# Patient Record
Sex: MALE | Race: WHITE | NOT HISPANIC OR LATINO | Employment: OTHER | URBAN - METROPOLITAN AREA
[De-identification: names, ages, dates, MRNs, and addresses within clinical notes are randomized per-mention and may not be internally consistent; named-entity substitution may affect disease eponyms.]

---

## 2017-09-13 ENCOUNTER — AMBULATORY - RIVER FALLS (OUTPATIENT)
Dept: FAMILY MEDICINE | Facility: CLINIC | Age: 67
End: 2017-09-13

## 2017-09-14 LAB
CHOLEST SERPL-MCNC: 148 MG/DL
CHOLEST/HDLC SERPL: 3.4 {RATIO}
CREAT SERPL-MCNC: 1.31 MG/DL (ref 0.7–1.25)
GLUCOSE BLD-MCNC: 158 MG/DL (ref 65–99)
HBA1C MFR BLD: 7.1 %
HDLC SERPL-MCNC: 44 MG/DL
LDLC SERPL CALC-MCNC: 80 MG/DL
NONHDLC SERPL-MCNC: 104 MG/DL
TRIGL SERPL-MCNC: 147 MG/DL

## 2017-09-18 ENCOUNTER — OFFICE VISIT - RIVER FALLS (OUTPATIENT)
Dept: FAMILY MEDICINE | Facility: CLINIC | Age: 67
End: 2017-09-18

## 2017-09-18 ASSESSMENT — MIFFLIN-ST. JEOR: SCORE: 1750.53

## 2018-03-19 ENCOUNTER — AMBULATORY - RIVER FALLS (OUTPATIENT)
Dept: FAMILY MEDICINE | Facility: CLINIC | Age: 68
End: 2018-03-19

## 2018-03-20 LAB
CHOLEST SERPL-MCNC: 160 MG/DL
CHOLEST/HDLC SERPL: 3.9 {RATIO}
CREAT SERPL-MCNC: 1.21 MG/DL (ref 0.7–1.25)
GLUCOSE BLD-MCNC: 190 MG/DL (ref 65–99)
HBA1C MFR BLD: 7.9 %
HDLC SERPL-MCNC: 41 MG/DL
LDLC SERPL CALC-MCNC: 83 MG/DL
NONHDLC SERPL-MCNC: 119 MG/DL
PSA SERPL-MCNC: 1.5 NG/ML
TRIGL SERPL-MCNC: 275 MG/DL

## 2018-03-21 ENCOUNTER — OFFICE VISIT - RIVER FALLS (OUTPATIENT)
Dept: FAMILY MEDICINE | Facility: CLINIC | Age: 68
End: 2018-03-21

## 2018-03-21 ASSESSMENT — MIFFLIN-ST. JEOR: SCORE: 1754.16

## 2018-06-06 ENCOUNTER — OFFICE VISIT - RIVER FALLS (OUTPATIENT)
Dept: FAMILY MEDICINE | Facility: CLINIC | Age: 68
End: 2018-06-06

## 2018-06-06 ASSESSMENT — MIFFLIN-ST. JEOR: SCORE: 1736.92

## 2018-07-02 ENCOUNTER — COMMUNICATION - RIVER FALLS (OUTPATIENT)
Dept: FAMILY MEDICINE | Facility: CLINIC | Age: 68
End: 2018-07-02

## 2018-07-02 ENCOUNTER — OFFICE VISIT - RIVER FALLS (OUTPATIENT)
Dept: FAMILY MEDICINE | Facility: CLINIC | Age: 68
End: 2018-07-02

## 2018-07-02 ASSESSMENT — MIFFLIN-ST. JEOR: SCORE: 1716.96

## 2018-07-06 ENCOUNTER — OFFICE VISIT - RIVER FALLS (OUTPATIENT)
Dept: FAMILY MEDICINE | Facility: CLINIC | Age: 68
End: 2018-07-06

## 2018-07-09 ENCOUNTER — OFFICE VISIT - RIVER FALLS (OUTPATIENT)
Dept: FAMILY MEDICINE | Facility: CLINIC | Age: 68
End: 2018-07-09

## 2018-09-19 ENCOUNTER — AMBULATORY - RIVER FALLS (OUTPATIENT)
Dept: FAMILY MEDICINE | Facility: CLINIC | Age: 68
End: 2018-09-19

## 2018-09-20 LAB
CHOLEST SERPL-MCNC: 130 MG/DL
CHOLEST/HDLC SERPL: 3.7 {RATIO}
CREAT SERPL-MCNC: 1.16 MG/DL (ref 0.7–1.18)
GLUCOSE BLD-MCNC: 146 MG/DL (ref 65–99)
HBA1C MFR BLD: 6.8 %
HDLC SERPL-MCNC: 35 MG/DL
LDLC SERPL CALC-MCNC: 68 MG/DL
NONHDLC SERPL-MCNC: 95 MG/DL
TRIGL SERPL-MCNC: 206 MG/DL

## 2018-09-26 ENCOUNTER — OFFICE VISIT - RIVER FALLS (OUTPATIENT)
Dept: FAMILY MEDICINE | Facility: CLINIC | Age: 68
End: 2018-09-26

## 2018-09-26 ASSESSMENT — MIFFLIN-ST. JEOR: SCORE: 1697

## 2018-10-03 ENCOUNTER — OFFICE VISIT - RIVER FALLS (OUTPATIENT)
Dept: FAMILY MEDICINE | Facility: CLINIC | Age: 68
End: 2018-10-03

## 2018-10-03 ASSESSMENT — MIFFLIN-ST. JEOR: SCORE: 1700.63

## 2018-10-04 ENCOUNTER — SURGERY - HEALTHEAST (OUTPATIENT)
Dept: SURGERY | Facility: CLINIC | Age: 68
End: 2018-10-04

## 2018-10-04 ENCOUNTER — ANESTHESIA - HEALTHEAST (OUTPATIENT)
Dept: SURGERY | Facility: CLINIC | Age: 68
End: 2018-10-04

## 2018-10-04 ASSESSMENT — MIFFLIN-ST. JEOR: SCORE: 1870.67

## 2018-10-16 ENCOUNTER — ANESTHESIA - HEALTHEAST (OUTPATIENT)
Dept: SURGERY | Facility: HOSPITAL | Age: 68
End: 2018-10-16

## 2018-10-16 ENCOUNTER — SURGERY - HEALTHEAST (OUTPATIENT)
Dept: SURGERY | Facility: HOSPITAL | Age: 68
End: 2018-10-16

## 2018-10-22 ENCOUNTER — OFFICE VISIT - RIVER FALLS (OUTPATIENT)
Dept: FAMILY MEDICINE | Facility: CLINIC | Age: 68
End: 2018-10-22

## 2018-11-26 ENCOUNTER — OFFICE VISIT - RIVER FALLS (OUTPATIENT)
Dept: FAMILY MEDICINE | Facility: CLINIC | Age: 68
End: 2018-11-26

## 2019-05-13 ENCOUNTER — OFFICE VISIT - RIVER FALLS (OUTPATIENT)
Dept: FAMILY MEDICINE | Facility: CLINIC | Age: 69
End: 2019-05-13

## 2019-05-28 ENCOUNTER — AMBULATORY - RIVER FALLS (OUTPATIENT)
Dept: FAMILY MEDICINE | Facility: CLINIC | Age: 69
End: 2019-05-28

## 2019-05-29 ENCOUNTER — COMMUNICATION - RIVER FALLS (OUTPATIENT)
Dept: FAMILY MEDICINE | Facility: CLINIC | Age: 69
End: 2019-05-29

## 2019-05-29 LAB
A/G RATIO - HISTORICAL: 2.1 (ref 1–2.5)
ALBUMIN SERPL-MCNC: 4.7 GM/DL (ref 3.6–5.1)
ALP SERPL-CCNC: 72 UNIT/L (ref 40–115)
ALT SERPL W P-5'-P-CCNC: 25 UNIT/L (ref 9–46)
AST SERPL W P-5'-P-CCNC: 18 UNIT/L (ref 10–35)
BILIRUB SERPL-MCNC: 0.9 MG/DL (ref 0.2–1.2)
BUN SERPL-MCNC: 16 MG/DL (ref 7–25)
BUN/CREAT RATIO - HISTORICAL: 13 (ref 6–22)
CALCIUM SERPL-MCNC: 9.6 MG/DL (ref 8.6–10.3)
CHLORIDE BLD-SCNC: 101 MMOL/L (ref 98–110)
CHOLEST SERPL-MCNC: 161 MG/DL
CHOLEST/HDLC SERPL: 3.2 {RATIO}
CO2 SERPL-SCNC: 27 MMOL/L (ref 20–32)
CREAT SERPL-MCNC: 1.19 MG/DL (ref 0.7–1.18)
EGFRCR SERPLBLD CKD-EPI 2021: 62 ML/MIN/1.73M2
ERYTHROCYTE [DISTWIDTH] IN BLOOD BY AUTOMATED COUNT: 12.6 % (ref 11–15)
GLOBULIN: 2.2 (ref 1.9–3.7)
GLUCOSE BLD-MCNC: 171 MG/DL (ref 65–99)
HBA1C MFR BLD: 7.2 %
HCT VFR BLD AUTO: 43.5 % (ref 38.5–50)
HDLC SERPL-MCNC: 51 MG/DL
HGB BLD-MCNC: 14.8 GM/DL (ref 13.2–17.1)
LDLC SERPL CALC-MCNC: 83 MG/DL
MCH RBC QN AUTO: 29.7 PG (ref 27–33)
MCHC RBC AUTO-ENTMCNC: 34 GM/DL (ref 32–36)
MCV RBC AUTO: 87.3 FL (ref 80–100)
NONHDLC SERPL-MCNC: 110 MG/DL
PLATELET # BLD AUTO: 144 10*3/UL (ref 140–400)
PMV BLD: 12.7 FL (ref 7.5–12.5)
POTASSIUM BLD-SCNC: 4.1 MMOL/L (ref 3.5–5.3)
PROT SERPL-MCNC: 6.9 GM/DL (ref 6.1–8.1)
PSA SERPL-MCNC: 2.2 NG/ML
RBC # BLD AUTO: 4.98 10*6/UL (ref 4.2–5.8)
SODIUM SERPL-SCNC: 138 MMOL/L (ref 135–146)
TRIGL SERPL-MCNC: 173 MG/DL
WBC # BLD AUTO: 5.5 10*3/UL (ref 3.8–10.8)

## 2019-06-03 ENCOUNTER — OFFICE VISIT - RIVER FALLS (OUTPATIENT)
Dept: FAMILY MEDICINE | Facility: CLINIC | Age: 69
End: 2019-06-03

## 2019-06-03 ASSESSMENT — MIFFLIN-ST. JEOR: SCORE: 1737.83

## 2019-06-30 ENCOUNTER — OFFICE VISIT - RIVER FALLS (OUTPATIENT)
Dept: FAMILY MEDICINE | Facility: CLINIC | Age: 69
End: 2019-06-30

## 2019-06-30 LAB — DEPRECATED S PYO AG THROAT QL EIA: NOT DETECTED

## 2019-07-03 LAB — BACTERIA SPEC CULT: NORMAL

## 2019-12-10 ENCOUNTER — AMBULATORY - RIVER FALLS (OUTPATIENT)
Dept: FAMILY MEDICINE | Facility: CLINIC | Age: 69
End: 2019-12-10

## 2019-12-11 ENCOUNTER — COMMUNICATION - RIVER FALLS (OUTPATIENT)
Dept: FAMILY MEDICINE | Facility: CLINIC | Age: 69
End: 2019-12-11

## 2019-12-11 LAB
A/G RATIO - HISTORICAL: 2 (ref 1–2.5)
ALBUMIN SERPL-MCNC: 4.7 GM/DL (ref 3.6–5.1)
ALP SERPL-CCNC: 74 UNIT/L (ref 40–115)
ALT SERPL W P-5'-P-CCNC: 29 UNIT/L (ref 9–46)
AST SERPL W P-5'-P-CCNC: 21 UNIT/L (ref 10–35)
BILIRUB SERPL-MCNC: 1 MG/DL (ref 0.2–1.2)
BUN SERPL-MCNC: 16 MG/DL (ref 7–25)
BUN/CREAT RATIO - HISTORICAL: 13 (ref 6–22)
CALCIUM SERPL-MCNC: 9.6 MG/DL (ref 8.6–10.3)
CHLORIDE BLD-SCNC: 101 MMOL/L (ref 98–110)
CHOLEST SERPL-MCNC: 151 MG/DL
CHOLEST/HDLC SERPL: 3.4 {RATIO}
CO2 SERPL-SCNC: 29 MMOL/L (ref 20–32)
CREAT SERPL-MCNC: 1.21 MG/DL (ref 0.7–1.18)
CREAT UR-MCNC: 70 MG/DL (ref 20–320)
EGFRCR SERPLBLD CKD-EPI 2021: 60 ML/MIN/1.73M2
ERYTHROCYTE [DISTWIDTH] IN BLOOD BY AUTOMATED COUNT: 12.5 % (ref 11–15)
GLOBULIN: 2.3 (ref 1.9–3.7)
GLUCOSE BLD-MCNC: 162 MG/DL (ref 65–99)
HBA1C MFR BLD: 7 %
HCT VFR BLD AUTO: 44.1 % (ref 38.5–50)
HDLC SERPL-MCNC: 44 MG/DL
HGB BLD-MCNC: 15.2 GM/DL (ref 13.2–17.1)
LDLC SERPL CALC-MCNC: 81 MG/DL
MCH RBC QN AUTO: 29.8 PG (ref 27–33)
MCHC RBC AUTO-ENTMCNC: 34.5 GM/DL (ref 32–36)
MCV RBC AUTO: 86.5 FL (ref 80–100)
MICROALBUMIN UR-MCNC: 0.5 MG/DL
MICROALBUMIN/CREAT UR: 7 MG/G{CREAT}
NONHDLC SERPL-MCNC: 107 MG/DL
PLATELET # BLD AUTO: 151 10*3/UL (ref 140–400)
PMV BLD: 13 FL (ref 7.5–12.5)
POTASSIUM BLD-SCNC: 4.5 MMOL/L (ref 3.5–5.3)
PROT SERPL-MCNC: 7 GM/DL (ref 6.1–8.1)
RBC # BLD AUTO: 5.1 10*6/UL (ref 4.2–5.8)
SODIUM SERPL-SCNC: 139 MMOL/L (ref 135–146)
TRIGL SERPL-MCNC: 155 MG/DL
WBC # BLD AUTO: 6.4 10*3/UL (ref 3.8–10.8)

## 2019-12-16 ENCOUNTER — OFFICE VISIT - RIVER FALLS (OUTPATIENT)
Dept: FAMILY MEDICINE | Facility: CLINIC | Age: 69
End: 2019-12-16

## 2019-12-16 ASSESSMENT — MIFFLIN-ST. JEOR: SCORE: 1719.68

## 2020-05-08 ENCOUNTER — OFFICE VISIT - RIVER FALLS (OUTPATIENT)
Dept: FAMILY MEDICINE | Facility: CLINIC | Age: 70
End: 2020-05-08

## 2020-06-10 ENCOUNTER — AMBULATORY - RIVER FALLS (OUTPATIENT)
Dept: FAMILY MEDICINE | Facility: CLINIC | Age: 70
End: 2020-06-10

## 2020-06-10 ASSESSMENT — MIFFLIN-ST. JEOR: SCORE: 1708.8

## 2020-06-11 LAB
A/G RATIO - HISTORICAL: 2.2 (ref 1–2.5)
ALBUMIN SERPL-MCNC: 4.7 GM/DL (ref 3.6–5.1)
ALP SERPL-CCNC: 85 UNIT/L (ref 35–144)
ALT SERPL W P-5'-P-CCNC: 21 UNIT/L (ref 9–46)
AST SERPL W P-5'-P-CCNC: 17 UNIT/L (ref 10–35)
BILIRUB SERPL-MCNC: 1.1 MG/DL (ref 0.2–1.2)
BUN SERPL-MCNC: 18 MG/DL (ref 7–25)
BUN/CREAT RATIO - HISTORICAL: 14 (ref 6–22)
CALCIUM SERPL-MCNC: 10 MG/DL (ref 8.6–10.3)
CHLORIDE BLD-SCNC: 103 MMOL/L (ref 98–110)
CHOLEST SERPL-MCNC: 140 MG/DL
CHOLEST/HDLC SERPL: 3.5 {RATIO}
CO2 SERPL-SCNC: 29 MMOL/L (ref 20–32)
CREAT SERPL-MCNC: 1.3 MG/DL (ref 0.7–1.18)
CREAT UR-MCNC: 83 MG/DL (ref 20–320)
EGFRCR SERPLBLD CKD-EPI 2021: 55 ML/MIN/1.73M2
ERYTHROCYTE [DISTWIDTH] IN BLOOD BY AUTOMATED COUNT: 12.6 % (ref 11–15)
GLOBULIN: 2.1 (ref 1.9–3.7)
GLUCOSE BLD-MCNC: 129 MG/DL (ref 65–99)
HBA1C MFR BLD: 7 %
HCT VFR BLD AUTO: 43.7 % (ref 38.5–50)
HDLC SERPL-MCNC: 40 MG/DL
HGB BLD-MCNC: 15.1 GM/DL (ref 13.2–17.1)
LDLC SERPL CALC-MCNC: 72 MG/DL
MCH RBC QN AUTO: 30.2 PG (ref 27–33)
MCHC RBC AUTO-ENTMCNC: 34.6 GM/DL (ref 32–36)
MCV RBC AUTO: 87.4 FL (ref 80–100)
MICROALBUMIN UR-MCNC: 0.3 MG/DL
MICROALBUMIN/CREAT UR: 4 MG/G{CREAT}
NONHDLC SERPL-MCNC: 100 MG/DL
PLATELET # BLD AUTO: 130 10*3/UL (ref 140–400)
PMV BLD: 12.5 FL (ref 7.5–12.5)
POTASSIUM BLD-SCNC: 4.5 MMOL/L (ref 3.5–5.3)
PROT SERPL-MCNC: 6.8 GM/DL (ref 6.1–8.1)
PSA SERPL-MCNC: 2.3 NG/ML
RBC # BLD AUTO: 5 10*6/UL (ref 4.2–5.8)
SODIUM SERPL-SCNC: 140 MMOL/L (ref 135–146)
TRIGL SERPL-MCNC: 179 MG/DL
WBC # BLD AUTO: 5.3 10*3/UL (ref 3.8–10.8)

## 2020-06-15 ENCOUNTER — COMMUNICATION - RIVER FALLS (OUTPATIENT)
Dept: FAMILY MEDICINE | Facility: CLINIC | Age: 70
End: 2020-06-15

## 2020-06-16 ENCOUNTER — COMMUNICATION - RIVER FALLS (OUTPATIENT)
Dept: FAMILY MEDICINE | Facility: CLINIC | Age: 70
End: 2020-06-16

## 2020-06-16 LAB
ANAPLASMA PHAGOCYTOPHILUM - HISTORICAL: NORMAL
ANAPLASMA PHAGOCYTOPHILUM - HISTORICAL: NORMAL
ANAPLASMA PHAGOCYTOPHILUM AB, IGG, S: NORMAL
B BURGDOR IGG+IGM SER QL: <0.9
BABESIA MICROTI - HISTORICAL: NORMAL
BABESIA MICROTI - HISTORICAL: NORMAL TITER
BABESIA MICROTI IGG: NORMAL TITER
EHRLICHIA CHAFFEENSIS - HISTORICAL: NORMAL
EHRLICHIA CHAFFEENSIS AB,IGG - HISTORICAL: NORMAL
EHRLICHIA CHAFFEENSIS: NORMAL

## 2020-09-08 ENCOUNTER — COMMUNICATION - RIVER FALLS (OUTPATIENT)
Dept: FAMILY MEDICINE | Facility: CLINIC | Age: 70
End: 2020-09-08

## 2020-09-30 ENCOUNTER — AMBULATORY - RIVER FALLS (OUTPATIENT)
Dept: FAMILY MEDICINE | Facility: CLINIC | Age: 70
End: 2020-09-30

## 2020-10-01 LAB
A/G RATIO - HISTORICAL: 2.3 (ref 1–2.5)
ALBUMIN SERPL-MCNC: 4.5 GM/DL (ref 3.6–5.1)
ALP SERPL-CCNC: 79 UNIT/L (ref 35–144)
ALT SERPL W P-5'-P-CCNC: 22 UNIT/L (ref 9–46)
AST SERPL W P-5'-P-CCNC: 18 UNIT/L (ref 10–35)
BILIRUB SERPL-MCNC: 0.8 MG/DL (ref 0.2–1.2)
BUN SERPL-MCNC: 18 MG/DL (ref 7–25)
BUN/CREAT RATIO - HISTORICAL: 15 (ref 6–22)
CALCIUM SERPL-MCNC: 9.6 MG/DL (ref 8.6–10.3)
CHLORIDE BLD-SCNC: 104 MMOL/L (ref 98–110)
CHOLEST SERPL-MCNC: 125 MG/DL
CHOLEST/HDLC SERPL: 3 {RATIO}
CO2 SERPL-SCNC: 29 MMOL/L (ref 20–32)
CREAT SERPL-MCNC: 1.21 MG/DL (ref 0.7–1.18)
EGFRCR SERPLBLD CKD-EPI 2021: 59 ML/MIN/1.73M2
ERYTHROCYTE [DISTWIDTH] IN BLOOD BY AUTOMATED COUNT: 12.4 % (ref 11–15)
GLOBULIN: 2 (ref 1.9–3.7)
GLUCOSE BLD-MCNC: 144 MG/DL (ref 65–99)
HBA1C MFR BLD: 6.9 %
HCT VFR BLD AUTO: 43.5 % (ref 38.5–50)
HDLC SERPL-MCNC: 42 MG/DL
HGB BLD-MCNC: 14.8 GM/DL (ref 13.2–17.1)
LDLC SERPL CALC-MCNC: 58 MG/DL
MCH RBC QN AUTO: 29.9 PG (ref 27–33)
MCHC RBC AUTO-ENTMCNC: 34 GM/DL (ref 32–36)
MCV RBC AUTO: 87.9 FL (ref 80–100)
NONHDLC SERPL-MCNC: 83 MG/DL
PLATELET # BLD AUTO: 158 10*3/UL (ref 140–400)
PMV BLD: 12.5 FL (ref 7.5–12.5)
POTASSIUM BLD-SCNC: 4.8 MMOL/L (ref 3.5–5.3)
PROT SERPL-MCNC: 6.5 GM/DL (ref 6.1–8.1)
RBC # BLD AUTO: 4.95 10*6/UL (ref 4.2–5.8)
SODIUM SERPL-SCNC: 142 MMOL/L (ref 135–146)
TRIGL SERPL-MCNC: 168 MG/DL
WBC # BLD AUTO: 6.1 10*3/UL (ref 3.8–10.8)

## 2020-10-05 ENCOUNTER — COMMUNICATION - RIVER FALLS (OUTPATIENT)
Dept: FAMILY MEDICINE | Facility: CLINIC | Age: 70
End: 2020-10-05

## 2020-10-05 ENCOUNTER — OFFICE VISIT - RIVER FALLS (OUTPATIENT)
Dept: FAMILY MEDICINE | Facility: CLINIC | Age: 70
End: 2020-10-05

## 2020-10-05 ASSESSMENT — MIFFLIN-ST. JEOR: SCORE: 1706.08

## 2021-06-01 VITALS — BODY MASS INDEX: 23.16 KG/M2 | HEIGHT: 78 IN | WEIGHT: 200.19 LBS

## 2021-06-01 VITALS — BODY MASS INDEX: 20.92 KG/M2 | WEIGHT: 205 LBS

## 2021-06-20 NOTE — ANESTHESIA CARE TRANSFER NOTE
Last vitals:   Vitals:    10/04/18 1228   BP: (!) 163/95   Pulse: 69   Resp: 12   Temp: 36.7  C (98.1  F)   SpO2: 100%     Patient's level of consciousness is awake  Spontaneous respirations: yes  Maintains airway independently: yes  Dentition unchanged: yes  Oropharynx: oropharynx clear of all foreign objects    QCDR Measures:  ASA# 20 - Surgical Safety Checklist: WHO surgical safety checklist completed prior to induction  PQRS# 430 - Adult PONV Prevention: 4558F - Pt received => 2 anti-emetic agents (different classes) preop & intraop  ASA# 8 - Peds PONV Prevention: NA - Not pediatric patient, not GA or 2 or more risk factors NOT present  PQRS# 424 - Edith-op Temp Management: 4559F - At least one body temp DOCUMENTED => 35.5C or 95.9F within required timeframe  PQRS# 426 - PACU Transfer Protocol: - Transfer of care checklist used  ASA# 14 - Acute Post-op Pain: ASA14B - Patient did NOT experience pain >= 7 out of 10

## 2021-06-20 NOTE — ANESTHESIA PREPROCEDURE EVALUATION
Anesthesia Evaluation      Patient summary reviewed   No history of anesthetic complications     Airway   Mallampati: III   Pulmonary - normal exam                          Cardiovascular - normal exam  Exercise tolerance: > or = 4 METS  (+) hypertension, CAD, , hypercholesterolemia,     ECG reviewed  Rhythm: regular  Rate: normal,         Neuro/Psych      Endo/Other    (+) diabetes mellitus, obesity,      GI/Hepatic/Renal    (+)   chronic renal disease CRI,           Dental    (+) poor dentition                       Anesthesia Plan  Planned anesthetic: general endotracheal  -- RSI with Succ  -- Esmolol drawn up and ready for administration  -- PONV prophylaxis with Decadron 10 mg and Zofran 4 mg      ASA 3   Induction: intravenous   Anesthetic plan and risks discussed with: patient and spouse  Anesthesia plan special considerations: rapid sequence induction, antiemetics,   Post-op plan: routine recovery

## 2021-06-21 NOTE — ANESTHESIA POSTPROCEDURE EVALUATION
Patient: Manny Sarabia  CYSTOSCOPY, BILATERAL RETROGRADE PYELOGRAM, BILATERAL URETEROSCOPY WITH HOLMIUM LASER LITHOTRIPSY BILATERAL URETERAL STENT EXCHANGE  Anesthesia type: general    Patient location: PACU  Last vitals:   Vitals:    10/16/18 1610   BP: 140/78   Pulse: 78   Resp: 16   Temp: 36.7  C (98  F)   SpO2: 99%     Post vital signs: stable  Level of consciousness: awake and responds to simple questions  Post-anesthesia pain: pain controlled  Post-anesthesia nausea and vomiting: no  Pulmonary: unassisted, return to baseline  Cardiovascular: stable and blood pressure at baseline  Hydration: adequate  Anesthetic events: no    QCDR Measures:  ASA# 11 - Edith-op Cardiac Arrest: ASA11B - Patient did NOT experience unanticipated cardiac arrest  ASA# 12 - Edith-op Mortality Rate: ASA12B - Patient did NOT die  ASA# 13 - PACU Re-Intubation Rate: ASA13B - Patient did NOT require a new airway mgmt  ASA# 10 - Composite Anes Safety: ASA10A - No serious adverse event    Additional Notes:

## 2021-06-21 NOTE — ANESTHESIA CARE TRANSFER NOTE
Last vitals:   Vitals:    10/16/18 1439   BP: 151/82   Pulse: 81   Resp: 16   Temp: 36.6  C (97.9  F)   SpO2: 100%     Patient's level of consciousness is drowsy  Spontaneous respirations: yes  Maintains airway independently: yes  Dentition unchanged: yes  Oropharynx: oropharynx clear of all foreign objects    QCDR Measures:  ASA# 20 - Surgical Safety Checklist: WHO surgical safety checklist completed prior to induction  PQRS# 430 - Adult PONV Prevention: 4558F - Pt received => 2 anti-emetic agents (different classes) preop & intraop  ASA# 8 - Peds PONV Prevention: NA - Not pediatric patient, not GA or 2 or more risk factors NOT present  PQRS# 424 - Edith-op Temp Management: 4559F - At least one body temp DOCUMENTED => 35.5C or 95.9F within required timeframe  PQRS# 426 - PACU Transfer Protocol: - Transfer of care checklist used  ASA# 14 - Acute Post-op Pain: ASA14B - Patient did NOT experience pain >= 7 out of 10
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
## 2021-10-06 ENCOUNTER — OFFICE VISIT - RIVER FALLS (OUTPATIENT)
Dept: FAMILY MEDICINE | Facility: CLINIC | Age: 71
End: 2021-10-06

## 2021-10-06 ENCOUNTER — TRANSFERRED RECORDS (OUTPATIENT)
Dept: MULTI SPECIALTY CLINIC | Facility: CLINIC | Age: 71
End: 2021-10-06

## 2021-10-06 ASSESSMENT — MIFFLIN-ST. JEOR: SCORE: 1694.05

## 2021-10-07 LAB
BUN SERPL-MCNC: 18 MG/DL (ref 7–25)
BUN/CREAT RATIO - HISTORICAL: ABNORMAL (ref 6–22)
CALCIUM SERPL-MCNC: 9.4 MG/DL (ref 8.6–10.3)
CALCIUM SERPL-MCNC: 9.5 MG/DL (ref 8.6–10.3)
CHLORIDE BLD-SCNC: 104 MMOL/L (ref 98–110)
CHOLEST SERPL-MCNC: 118 MG/DL
CHOLEST/HDLC SERPL: 3.1 {RATIO}
CO2 SERPL-SCNC: 29 MMOL/L (ref 20–32)
CREAT SERPL-MCNC: 1.14 MG/DL (ref 0.7–1.18)
EGFRCR SERPLBLD CKD-EPI 2021: 63 ML/MIN/1.73M2
GLUCOSE BLD-MCNC: 150 MG/DL (ref 65–99)
HBA1C MFR BLD: 7.1 %
HDLC SERPL-MCNC: 38 MG/DL
LDLC SERPL CALC-MCNC: 52 MG/DL
MICROALBUMIN UR-MCNC: 0.5 MG/DL
NONHDLC SERPL-MCNC: 80 MG/DL
POTASSIUM BLD-SCNC: 4.4 MMOL/L (ref 3.5–5.3)
PSA SERPL-MCNC: 3.67 NG/ML
PTH-INTACT SERPL-MCNC: 62 PG/ML (ref 14–64)
SODIUM SERPL-SCNC: 141 MMOL/L (ref 135–146)
TRIGL SERPL-MCNC: 227 MG/DL

## 2021-10-08 ENCOUNTER — COMMUNICATION - RIVER FALLS (OUTPATIENT)
Dept: FAMILY MEDICINE | Facility: CLINIC | Age: 71
End: 2021-10-08

## 2022-02-11 VITALS
OXYGEN SATURATION: 97 % | WEIGHT: 217.6 LBS | HEIGHT: 71 IN | SYSTOLIC BLOOD PRESSURE: 134 MMHG | HEART RATE: 60 BPM | DIASTOLIC BLOOD PRESSURE: 86 MMHG | BODY MASS INDEX: 30.46 KG/M2

## 2022-02-11 VITALS
OXYGEN SATURATION: 99 % | HEART RATE: 59 BPM | SYSTOLIC BLOOD PRESSURE: 130 MMHG | HEIGHT: 71 IN | BODY MASS INDEX: 28.85 KG/M2 | DIASTOLIC BLOOD PRESSURE: 70 MMHG | WEIGHT: 206.1 LBS

## 2022-02-11 VITALS
DIASTOLIC BLOOD PRESSURE: 80 MMHG | HEART RATE: 67 BPM | OXYGEN SATURATION: 96 % | HEIGHT: 71 IN | SYSTOLIC BLOOD PRESSURE: 134 MMHG | BODY MASS INDEX: 29.4 KG/M2 | WEIGHT: 210 LBS

## 2022-02-11 VITALS
HEART RATE: 56 BPM | DIASTOLIC BLOOD PRESSURE: 80 MMHG | TEMPERATURE: 96.2 F | HEIGHT: 71 IN | WEIGHT: 207.6 LBS | SYSTOLIC BLOOD PRESSURE: 129 MMHG | BODY MASS INDEX: 29.06 KG/M2

## 2022-02-11 VITALS
SYSTOLIC BLOOD PRESSURE: 138 MMHG | WEIGHT: 214 LBS | OXYGEN SATURATION: 98 % | TEMPERATURE: 97.1 F | DIASTOLIC BLOOD PRESSURE: 82 MMHG | HEART RATE: 71 BPM | BODY MASS INDEX: 29.37 KG/M2 | HEIGHT: 71 IN | SYSTOLIC BLOOD PRESSURE: 138 MMHG | HEART RATE: 68 BPM | BODY MASS INDEX: 29.96 KG/M2 | OXYGEN SATURATION: 98 % | WEIGHT: 210.6 LBS | DIASTOLIC BLOOD PRESSURE: 68 MMHG

## 2022-02-11 VITALS
SYSTOLIC BLOOD PRESSURE: 182 MMHG | HEART RATE: 60 BPM | TEMPERATURE: 97.3 F | TEMPERATURE: 97.1 F | HEIGHT: 71 IN | BODY MASS INDEX: 29.18 KG/M2 | HEART RATE: 56 BPM | HEIGHT: 71 IN | BODY MASS INDEX: 29.93 KG/M2 | WEIGHT: 209.4 LBS | TEMPERATURE: 98.1 F | WEIGHT: 213.8 LBS | BODY MASS INDEX: 29.31 KG/M2 | DIASTOLIC BLOOD PRESSURE: 78 MMHG | HEART RATE: 56 BPM | DIASTOLIC BLOOD PRESSURE: 80 MMHG | SYSTOLIC BLOOD PRESSURE: 128 MMHG | SYSTOLIC BLOOD PRESSURE: 148 MMHG | DIASTOLIC BLOOD PRESSURE: 78 MMHG | WEIGHT: 209.2 LBS

## 2022-02-11 VITALS
BODY MASS INDEX: 28.7 KG/M2 | SYSTOLIC BLOOD PRESSURE: 122 MMHG | OXYGEN SATURATION: 98 % | WEIGHT: 205.8 LBS | WEIGHT: 205 LBS | DIASTOLIC BLOOD PRESSURE: 78 MMHG | TEMPERATURE: 97.6 F | SYSTOLIC BLOOD PRESSURE: 128 MMHG | HEART RATE: 57 BPM | DIASTOLIC BLOOD PRESSURE: 70 MMHG | HEIGHT: 71 IN | HEART RATE: 62 BPM | BODY MASS INDEX: 28.81 KG/M2 | HEART RATE: 66 BPM | HEIGHT: 71 IN | DIASTOLIC BLOOD PRESSURE: 70 MMHG | SYSTOLIC BLOOD PRESSURE: 126 MMHG

## 2022-02-11 VITALS
SYSTOLIC BLOOD PRESSURE: 126 MMHG | DIASTOLIC BLOOD PRESSURE: 62 MMHG | WEIGHT: 207 LBS | HEART RATE: 62 BPM | OXYGEN SATURATION: 98 % | HEIGHT: 71 IN | BODY MASS INDEX: 28.98 KG/M2

## 2022-02-11 VITALS
DIASTOLIC BLOOD PRESSURE: 72 MMHG | HEIGHT: 71 IN | OXYGEN SATURATION: 99 % | SYSTOLIC BLOOD PRESSURE: 132 MMHG | BODY MASS INDEX: 30.35 KG/M2 | HEART RATE: 61 BPM | WEIGHT: 216.8 LBS

## 2022-02-11 VITALS — BODY MASS INDEX: 28.7 KG/M2 | HEIGHT: 71 IN

## 2022-02-15 NOTE — NURSING NOTE
Patient had dropped off a form in clinic on 6/4/18 to be completed by KIRAN. Per KIRAN, patient will need to schedule an appt and the form can be completed at that time.   3:14pm Called and LM asking that he call back and schedule an appt at his convenience.
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
## 2022-02-15 NOTE — NURSING NOTE
Comprehensive Intake Entered On:  10/5/2020 9:32 AM CDT    Performed On:  10/5/2020 9:28 AM CDT by Elizabeth Epstein CMA               Summary   Chief Complaint :   Pt here for med ck   Weight Measured :   207 lb(Converted to: 207 lb 0 oz, 93.894 kg)    Height Measured :   71 in(Converted to: 5 ft 11 in, 180.34 cm)    Body Mass Index :   28.87 kg/m2 (HI)    Body Surface Area :   2.17 m2   Systolic Blood Pressure :   126 mmHg   Diastolic Blood Pressure :   62 mmHg   Mean Arterial Pressure :   83 mmHg   Peripheral Pulse Rate :   62 bpm   Oxygen Saturation :   98 %   Elizabeth Epstein CMA - 10/5/2020 9:28 AM CDT   Health Status   Allergies Verified? :   Yes   Medication History Verified? :   Yes   Medical History Verified? :   Yes   Pre-Visit Planning Status :   Completed   Tobacco Use? :   Never smoker   Elizabeth Epstein CMA - 10/5/2020 9:28 AM CDT   Consents   Consent for Immunization Exchange :   Consent Granted   Consent for Immunizations to Providers :   Consent Granted   Elizabeth Epstein CMA - 10/5/2020 9:28 AM CDT   Meds / Allergies   (As Of: 10/5/2020 9:32:09 AM CDT)   Allergies (Active)   No Known Medication Allergies  Estimated Onset Date:   Unspecified ; Created By:   Rubi Love CMA; Reaction Status:   Active ; Category:   Drug ; Substance:   No Known Medication Allergies ; Type:   Allergy ; Updated By:   Rubi Love CMA; Reviewed Date:   10/5/2020 9:30 AM CDT        Medication List   (As Of: 10/5/2020 9:32:09 AM CDT)   Prescription/Discharge Order    atorvastatin  :   atorvastatin ; Status:   Prescribed ; Ordered As Mnemonic:   atorvastatin 40 mg oral tablet ; Simple Display Line:   40 mg, 1 tab(s), po, daily, 90 tab(s), 2 Refill(s) ; Ordering Provider:   Timoteo Neil MD; Catalog Code:   atorvastatin ; Order Dt/Tm:   3/19/2020 10:03:04 AM CDT          lisinopril  :   lisinopril ; Status:   Prescribed ; Ordered As Mnemonic:   lisinopril 10 mg oral tablet ; Simple Display Line:   10 mg, 1 tab(s), Oral,  daily, 90 tab(s), 2 Refill(s) ; Ordering Provider:   Timoteo Neil MD; Catalog Code:   lisinopril ; Order Dt/Tm:   3/19/2020 10:03:28 AM CDT          metFORMIN  :   metFORMIN ; Status:   Prescribed ; Ordered As Mnemonic:   metFORMIN 500 mg oral tablet, extended release ; Simple Display Line:   2 tab(s), Oral, bid, 360 tab(s), 2 Refill(s) ; Ordering Provider:   Timoteo Neil MD; Catalog Code:   metFORMIN ; Order Dt/Tm:   3/19/2020 10:03:51 AM CDT            ID Risk Screen   Recent Travel History :   No recent travel   Family Member Travel History :   No recent travel   Other Exposure to Infectious Disease :   Unknown   Elizabeth Epstein CMA - 10/5/2020 9:28 AM CDT

## 2022-02-15 NOTE — NURSING NOTE
Depression Screening Entered On:  10/5/2020 10:11 AM CDT    Performed On:  10/5/2020 10:11 AM CDT by Elizabeth Epstein CMA               Depression Screening   Little Interest - Pleasure in Activities :   Not at all   Feeling Down, Depressed, Hopeless :   Not at all   Initial Depression Screen Score :   0 Score   Poor Appetite or Overeating :   Not at all   Trouble Falling or Staying Asleep :   Not at all   Feeling Tired or Little Energy :   Not at all   Feeling Bad About Yourself :   Not at all   Trouble Concentrating :   Not at all   Moving or Speaking Slowly :   Not at all   Thoughts Better Off Dead or Hurting Self :   Not at all   GRAEME Difficulty with Work, Home, Others :   Not difficult at all   Detailed Depression Screen Score :   0    Total Depression Screen Score :   0    Elizabeth Epstein CMA - 10/5/2020 10:11 AM CDT
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
## 2022-02-15 NOTE — TELEPHONE ENCOUNTER
Entered by Elizabeth Epstein CMA on December 21, 2020 9:14:35 AM CST  ---------------------  From: Elizabeth Epstein CMA   To: [facility]    Sent: 12/21/2020 9:14:35 AM CST  Subject: Medication Management     ** Submitted: **  Order:metFORMIN (MetFORMIN (Eqv-Glucophage XR) 500 mg oral tablet, extended release)  2 tab(s)  Oral  bid  Qty:  360 tab(s)        Days Supply:  90        Refills:  2          LISSA     Route To Pharmacy - [facility]    Signed by Elizabeth Epstein CMA  12/21/2020 3:14:00 PM [facility]    ** Submitted: **  Complete:metFORMIN (metFORMIN 500 mg oral tablet)   Signed by Elizabeth Epstein CMA  12/21/2020 3:14:00 PM [facility]    ** Not Approved:  **  metFORMIN (METFORMIN ER TAB 500MG GP)  TAKE 2 TABLETS TWICE A DAY  Qty:  360 tab(s)        Days Supply:  90        Refills:  2          LISSA     Route To Pharmacy - [facility]   Signed by Elizabeth Epstein CMA            ------------------------------------------  From: [facility]  To: Timoteo Neil MD  Sent: December 19, 2020 8:16:30 AM CST  Subject: Medication Management  Due: December 16, 2020 8:25:31 PM CST     ** On Hold Pending Signature **     Drug: metFORMIN (MetFORMIN (Eqv-Glucophage XR) 500 mg oral tablet, extended release), TAKE 2 TABLETS TWICE A DAY  Quantity: 360 tab(s)  Days Supply: 90  Refills: 2  Substitutions Allowed  Notes from Pharmacy:     Dispensed Drug: metFORMIN (MetFORMIN (Eqv-Glucophage XR) 500 mg oral tablet, extended release), TAKE 2 TABLETS TWICE A DAY  Quantity: 360 tab(s)  Days Supply: 90  Refills: 2  Substitutions Allowed  Notes from Pharmacy:  ------------------------------------------

## 2022-02-15 NOTE — LETTER
(Inserted Image. Unable to display)         June 16, 2020        STEPHEN JASWANT  [address] [phone]        Dear STEPHEN,    Thank you for selecting [facility] for your healthcare needs. Below you will find the results of your recent test(s) done at our clinic.      The testing was negative (normal) for tick borne diseases.  Please follow up with further concerns.  Thank you,  Malu Rodríguez MD      Result Name Current Result Reference Range   Anaplasma phagocytophilum IgG  <1:64 6/10/2020  - <1:64   Anaplasma phagocytophilum IgM  <1:20 6/10/2020  - <1:20   Anaplasma phagocytophilum Interp  See comment 6/10/2020    Ehrlichia chaffeensis IgG  <1:64 6/10/2020  - <1:64   Ehrlichia chaffeensis IgM  <1:20 6/10/2020  - <1:20   Ehrlichia chaffeensis Interp  See comment 6/10/2020    Babesia microti IgG (Titer)  <1:64 6/10/2020    Babesia microti IgM (Titer)  <1:20 6/10/2020    Babesia microti Interp  See comment 6/10/2020    Lyme Ab IgG/IgM WB  <0.90 6/10/2020        Please contact my practice at [phone] if you have any questions or concerns.     Sincerely,        Malu Rodríguez MD      What do your labs mean?  Below is a glossary of commonly ordered labs:  LDL - Bad Cholesterol  HDL- Good Cholesterol  AST/ALT- Liver Function  Cr/Creatinine - Kidney Function  Microalbumin - Kidney Function  BUN - Kidney Function  PSA - Prostate   TSH - Thyroid  HgbA1c - Diabetes Test  Hgb (Hemoglobin) - Red Blood Cells

## 2022-02-15 NOTE — LETTER
(Inserted Image. Unable to display)       March 28, 2019      STEPHENMARCIE MICHAUD  [address] [place] [id number]          Dear STEPHEN,      Thank you for selecting [facility] for your healthcare needs.     Our records indicate you are due for the following services:    Fasting Lab Tests ~ Please do not eat or drink anything 10 hours prior to your scheduled appointment time.  (Water and any medications that you may need are allowed unless directed otherwise.)    If you had your labs done at another facility or with Direct Access Lab Testing at [facility], please bring in a copy of the results to your next visit, mail a copy, or drop off a copy of your results to your Healthcare Provider.    Medication Check  Diabetic Exam ~ Please bring your glucose meter and/or your blood glucose diary to your appointment.  Hypertension Check ~ Please remember to bring any at-home blood pressure readings with you to your appointment.    You are due for lab work and an office visit; please schedule the lab appointment 1 week before the office visit.  This will assure all results are available to discuss with your Healthcare Provider during your visit.    **It is very helpful if you bring your medication bottles to your appointment.  This assures we have all of your current medications, including strength and dosing information, documented accurately in your medical record.    To schedule an appointment or if you have further questions, please contact your primary clinic:   [facility]       [phone]   [facility]       [phone]              [facility]     [phone]    Powered by [employer]    Sincerely,    Timoteo Neil MD
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
## 2022-02-15 NOTE — NURSING NOTE
Vital Signs Entered On:  10/6/2021 7:59 AM CDT    Performed On:  10/6/2021 7:59 AM CDT by Elise Mcconnell CMA               Vital Signs   Systolic Blood Pressure :   130 mmHg   Diastolic Blood Pressure :   70 mmHg   Mean Arterial Pressure :   90 mmHg   Elise Mcconnell CMA - 10/6/2021 7:59 AM CDT

## 2022-02-15 NOTE — NURSING NOTE
Comprehensive Intake Entered On:  5/8/2020 9:11 AM CDT    Performed On:  5/8/2020 9:03 AM CDT by Jeni Diaz CMA               Summary   Chief Complaint :   Verbal consent given for telephone visit. Concerns with tick bite on L flank. Removed on Monday. Possibly attached for 24-48 hrs. Site is now red, firm, itches, measuring 1in x 1.5in. Feels fine. Using peroxide daily.    Height Measured :   71 in(Converted to: 5 ft 11 in, 180.34 cm)    Jeni Diaz CMA - 5/8/2020 9:03 AM CDT   Health Status   Allergies Verified? :   Yes   Medication History Verified? :   Yes   Pre-Visit Planning Status :   Not completed   Jeni Diaz CMA - 5/8/2020 9:03 AM CDT   Meds / Allergies   (As Of: 5/8/2020 9:11:49 AM CDT)   Allergies (Active)   No Known Medication Allergies  Estimated Onset Date:   Unspecified ; Created By:   Rubi Love CMA; Reaction Status:   Active ; Category:   Drug ; Substance:   No Known Medication Allergies ; Type:   Allergy ; Updated By:   Rubi Love CMA; Reviewed Date:   12/16/2019 9:24 AM CST        Medication List   (As Of: 5/8/2020 9:11:49 AM CDT)   Prescription/Discharge Order    atorvastatin  :   atorvastatin ; Status:   Prescribed ; Ordered As Mnemonic:   atorvastatin 40 mg oral tablet ; Simple Display Line:   40 mg, 1 tab(s), po, daily, 90 tab(s), 2 Refill(s) ; Ordering Provider:   Timoteo Neil MD; Catalog Code:   atorvastatin ; Order Dt/Tm:   3/19/2020 10:03:04 AM CDT          lisinopril  :   lisinopril ; Status:   Prescribed ; Ordered As Mnemonic:   lisinopril 10 mg oral tablet ; Simple Display Line:   10 mg, 1 tab(s), Oral, daily, 90 tab(s), 2 Refill(s) ; Ordering Provider:   Timoteo Neil MD; Catalog Code:   lisinopril ; Order Dt/Tm:   3/19/2020 10:03:28 AM CDT          metFORMIN  :   metFORMIN ; Status:   Prescribed ; Ordered As Mnemonic:   metFORMIN 500 mg oral tablet, extended release ; Simple Display Line:   2 tab(s), Oral, bid, 360 tab(s), 2 Refill(s) ; Ordering Provider:    Rashaad BOYCE, Timoteo; Catalog Code:   metFORMIN ; Order Dt/Tm:   3/19/2020 10:03:51 AM CDT            ID Risk Screen   Recent Travel History :   No recent travel   Family Member Travel History :   No recent travel   Other Exposure to Infectious Disease :   Unknown   Jeni Diaz CMA - 5/8/2020 9:03 AM CDT

## 2022-02-15 NOTE — LETTER
(Inserted Image. Unable to display)   [address] [facility] [address]  Chari 15, 2020      STEPHEN JASWANT  [address]  [place], [place] [id number]        Dear STEPHEN,     Thank you for selecting [facility] for your healthcare needs. Below you will find the results of the recent tests done at our clinic.        All results are within acceptable limits. No treatment changes are recommended at this time.      Result Name Current Result Previous Result Reference Range   Sodium Level (mmol/L)  140 6/10/2020  139 12/10/2019 135 - 146   Potassium Level (mmol/L)  4.5 6/10/2020  4.5 12/10/2019 3.5 - 5.3   Chloride Level (mmol/L)  103 6/10/2020  101 12/10/2019 98 - 110   CO2 Level (mmol/L)  29 6/10/2020  29 12/10/2019 20 - 32   Glucose Level (mg/dL) ((H)) 129 6/10/2020 ((H)) 162 12/10/2019 65 - 99   BUN (mg/dL)  18 6/10/2020  16 12/10/2019 7 - 25   Creatinine Level (mg/dL) ((H)) 1.30 6/10/2020 ((H)) 1.21 12/10/2019 0.70 - 1.18   BUN/Creat Ratio  14 6/10/2020  13 12/10/2019 6 - 22   eGFR (mL/min/1.73m2) ((L)) 55 6/10/2020  60 12/10/2019 > OR = 60 -    eGFR  (mL/min/1.73m2)  64 6/10/2020  69 12/10/2019 > OR = 60 -    Calcium Level (mg/dL)  10.0 6/10/2020  9.6 12/10/2019 8.6 - 10.3   Bilirubin Total (mg/dL)  1.1 6/10/2020  1.0 12/10/2019 0.2 - 1.2   Alkaline Phosphatase (unit/L)  85 6/10/2020  74 12/10/2019 35 - 144   AST/SGOT (unit/L)  17 6/10/2020  21 12/10/2019 10 - 35   ALT/SGPT (unit/L)  21 6/10/2020  29 12/10/2019 9 - 46   Protein Total (gm/dL)  6.8 6/10/2020  7.0 12/10/2019 6.1 - 8.1   Albumin Level (gm/dL)  4.7 6/10/2020  4.7 12/10/2019 3.6 - 5.1   Globulin  2.1 6/10/2020  2.3 12/10/2019 1.9 - 3.7   A/G Ratio  2.2 6/10/2020  2.0 12/10/2019 1.0 - 2.5   Hgb A1c ((H)) 7.0 6/10/2020 ((H)) 7.0 12/10/2019  - <5.7   Cholesterol (mg/dL)  140 6/10/2020  151 12/10/2019  - <200   Non-HDL Cholesterol  100 6/10/2020  107 12/10/2019  - <130   HDL (mg/dL)  40 6/10/2020  44 12/10/2019 > OR  = 40 -    Cholesterol/HDL Ratio  3.5 6/10/2020  3.4 12/10/2019  - <5.0   LDL  72 6/10/2020  81 12/10/2019    Triglyceride (mg/dL) ((H)) 179 6/10/2020 ((H)) 155 12/10/2019  - <150   PSA (ng/mL)  2.3 6/10/2020  2.2 5/28/2019  - < OR = 4.0   U Microalbumin (mg/dL)  0.3 6/10/2020  0.5 12/10/2019 See Note: -    Ur Creatinine (mg/dL)  83 6/10/2020  70 12/10/2019 20 - 320   Ur Microalbumin/Creatinine Ratio  4 6/10/2020  7 12/10/2019  - <30   WBC  5.3 6/10/2020  6.4 12/10/2019 3.8 - 10.8   RBC  5.00 6/10/2020  5.10 12/10/2019 4.20 - 5.80   Hgb (gm/dL)  15.1 6/10/2020  15.2 12/10/2019 13.2 - 17.1   Hct (%)  43.7 6/10/2020  44.1 12/10/2019 38.5 - 50.0   MCV (fL)  87.4 6/10/2020  86.5 12/10/2019 80.0 - 100.0   MCH (pg)  30.2 6/10/2020  29.8 12/10/2019 27.0 - 33.0   MCHC (gm/dL)  34.6 6/10/2020  34.5 12/10/2019 32.0 - 36.0   RDW (%)  12.6 6/10/2020  12.5 12/10/2019 11.0 - 15.0   Platelet ((L)) 130 6/10/2020  151 12/10/2019 140 - 400   MPV (fL)  12.5 6/10/2020 ((H)) 13.0 12/10/2019 7.5 - 12.5       Please contact my practice at [phone]  if you have any questions or concerns.     Sincerely,        Timoteo Neil MD          What do your labs mean?  Below is a glossary of commonly ordered labs:  LDL   Bad Cholesterol   HDL   Good Cholesterol  AST/ALT   Liver Function   Cr/Creatinine   Kidney Function  Microalbumin   Kidney Function  BUN   Kidney Function  PSA   Prostate    TSH   Thyroid Hormone  HgbA1c   Diabetes Test   Hgb (Hemoglobin)   Red Blood Cells

## 2022-02-15 NOTE — CARE COORDINATION
Pt appears on [facility] chronic disease panel as out of parameters for ASA  Called to pt, he states that he can't take ASA anymore, he bleeds to much on it.

## 2022-02-15 NOTE — LETTER
(Inserted Image. Unable to display)   [facility] [address]  December 11, 2019      STEPHEN JASWANT  [address]  [place], [place] [id number]        Dear STEPHEN,     Thank you for selecting [facility] for your healthcare needs. Below you will find the results of the recent tests done at our clinic.        All results are within acceptable limits. No treatment changes are recommended at this time.      Result Name Current Result Previous Result Reference Range   Sodium Level (mmol/L)  139 12/10/2019  138 5/28/2019 135 - 146   Potassium Level (mmol/L)  4.5 12/10/2019  4.1 5/28/2019 3.5 - 5.3   Chloride Level (mmol/L)  101 12/10/2019  101 5/28/2019 98 - 110   CO2 Level (mmol/L)  29 12/10/2019  27 5/28/2019 20 - 32   Glucose Level (mg/dL) ((H)) 162 12/10/2019 ((H)) 171 5/28/2019 65 - 99   BUN (mg/dL)  16 12/10/2019  16 5/28/2019 7 - 25   Creatinine Level (mg/dL) ((H)) 1.21 12/10/2019 ((H)) 1.19 5/28/2019 0.70 - 1.18   BUN/Creat Ratio  13 12/10/2019  13 5/28/2019 6 - 22   eGFR (mL/min/1.73m2)  60 12/10/2019  62 5/28/2019 > OR = 60 -    eGFR  (mL/min/1.73m2)  69 12/10/2019  71 5/28/2019 > OR = 60 -    Calcium Level (mg/dL)  9.6 12/10/2019  9.6 5/28/2019 8.6 - 10.3   Bilirubin Total (mg/dL)  1.0 12/10/2019  0.9 5/28/2019 0.2 - 1.2   Alkaline Phosphatase (unit/L)  74 12/10/2019  72 5/28/2019 40 - 115   AST/SGOT (unit/L)  21 12/10/2019  18 5/28/2019 10 - 35   ALT/SGPT (unit/L)  29 12/10/2019  25 5/28/2019 9 - 46   Protein Total (gm/dL)  7.0 12/10/2019  6.9 5/28/2019 6.1 - 8.1   Albumin Level (gm/dL)  4.7 12/10/2019  4.7 5/28/2019 3.6 - 5.1   Globulin  2.3 12/10/2019  2.2 5/28/2019 1.9 - 3.7   A/G Ratio  2.0 12/10/2019  2.1 5/28/2019 1.0 - 2.5   Hgb A1c ((H)) 7.0 12/10/2019 ((H)) 7.2 5/28/2019  - <5.7   Cholesterol (mg/dL)  151 12/10/2019  161 5/28/2019  - <200   Non-HDL Cholesterol  107 12/10/2019  110 5/28/2019  - <130   HDL (mg/dL)  44 12/10/2019  51 5/28/2019 >40 -     Cholesterol/HDL Ratio  3.4 12/10/2019  3.2 5/28/2019  - <5.0   LDL  81 12/10/2019  83 5/28/2019    Triglyceride (mg/dL) ((H)) 155 12/10/2019 ((H)) 173 5/28/2019  - <150   WBC  6.4 12/10/2019  5.5 5/28/2019 3.8 - 10.8   RBC  5.10 12/10/2019  4.98 5/28/2019 4.20 - 5.80   Hgb (gm/dL)  15.2 12/10/2019  14.8 5/28/2019 13.2 - 17.1   Hct (%)  44.1 12/10/2019  43.5 5/28/2019 38.5 - 50.0   MCV (fL)  86.5 12/10/2019  87.3 5/28/2019 80.0 - 100.0   MCH (pg)  29.8 12/10/2019  29.7 5/28/2019 27.0 - 33.0   MCHC (gm/dL)  34.5 12/10/2019  34.0 5/28/2019 32.0 - 36.0   RDW (%)  12.5 12/10/2019  12.6 5/28/2019 11.0 - 15.0   Platelet  151 12/10/2019  144 5/28/2019 140 - 400   MPV (fL) ((H)) 13.0 12/10/2019 ((H)) 12.7 5/28/2019 7.5 - 12.5       Please contact my practice at [phone]  if you have any questions or concerns.     Sincerely,        Timoteo Neil MD          What do your labs mean?  Below is a glossary of commonly ordered labs:  LDL   Bad Cholesterol   HDL   Good Cholesterol  AST/ALT   Liver Function   Cr/Creatinine   Kidney Function  Microalbumin   Kidney Function  BUN   Kidney Function  PSA   Prostate    TSH   Thyroid Hormone  HgbA1c   Diabetes Test   Hgb (Hemoglobin)   Red Blood Cells

## 2022-02-15 NOTE — TELEPHONE ENCOUNTER
---------------------  From: Kami Huggins CMA (Phone Messages Pool ([id number]_[facility]))   To: [facility] Message Pool ([id number]_[facility])   ;     Sent: 9/8/2020 2:54:14 PM CDT  Subject: Phone Message     Phone Message    PCP:   [facility]      Time of Call:  1440       Person Calling:  Wife, Maxine.  Phone number:  [phone]    Returned call at: N/A    Note:   Annual visit schedule wonders if Dr Neil wants the labs done before the visit or same day? Please advise.    Last office visit and reason:  05/08/2020 cellulitis, tick bite [facility].lab appt made in [place]
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
## 2022-02-15 NOTE — NURSING NOTE
Diabetes Eye Testing Entered On:  2/4/2020 2:06 PM CST    Performed On:  2/3/2020 2:06 PM CST by Elizabeth Epstein CMA               Diabetes Eye Testing   Retinopathy Present TR :   No   Elizabeth Epstein CMA - 2/4/2020 2:06 PM CST

## 2022-02-15 NOTE — NURSING NOTE
Comprehensive Intake Entered On:  12/16/2019 9:26 AM CST    Performed On:  12/16/2019 9:24 AM CST by Elizabeth Epstein CMA               Summary   Chief Complaint :   Pt here for med ck   Weight Measured :   210 lb(Converted to: 210 lb 0 oz, 95.25 kg)    Height Measured :   71 in(Converted to: 5 ft 11 in, 180.34 cm)    Body Mass Index :   29.29 kg/m2 (HI)    Body Surface Area :   2.18 m2   Systolic Blood Pressure :   134 mmHg (HI)    Diastolic Blood Pressure :   80 mmHg   Mean Arterial Pressure :   98 mmHg   Peripheral Pulse Rate :   67 bpm   Oxygen Saturation :   96 %   Elizabeth Epstein CMA - 12/16/2019 9:24 AM CST   Health Status   Allergies Verified? :   Yes   Medication History Verified? :   Yes   Medical History Verified? :   Yes   Pre-Visit Planning Status :   Completed   Tobacco Use? :   Never smoker   Elziabeth Epstein CMA - 12/16/2019 9:24 AM CST   Consents   Consent for Immunization Exchange :   Consent Granted   Consent for Immunizations to Providers :   Consent Granted   Elizabeth Epstein CMA - 12/16/2019 9:24 AM CST   Meds / Allergies   (As Of: 12/16/2019 9:26:21 AM CST)   Allergies (Active)   No Known Medication Allergies  Estimated Onset Date:   Unspecified ; Created By:   Rubi Love CMA; Reaction Status:   Active ; Category:   Drug ; Substance:   No Known Medication Allergies ; Type:   Allergy ; Updated By:   Rubi Love CMA; Reviewed Date:   12/16/2019 9:24 AM CST        Medication List   (As Of: 12/16/2019 9:26:21 AM CST)   Prescription/Discharge Order    atorvastatin  :   atorvastatin ; Status:   Prescribed ; Ordered As Mnemonic:   atorvastatin 40 mg oral tablet ; Simple Display Line:   40 mg, 1 tab(s), po, daily, 90 tab(s), 1 Refill(s) ; Ordering Provider:   Timoteo Neil MD; Catalog Code:   atorvastatin ; Order Dt/Tm:   6/3/2019 11:32:54 AM CDT          lisinopril  :   lisinopril ; Status:   Prescribed ; Ordered As Mnemonic:   lisinopril 10 mg oral tablet ; Simple Display Line:   See  Instructions, TAKE 1 TABLET EVERY DAY, 90 tab(s), 1 Refill(s) ; Ordering Provider:   Timoteo Neil MD; Catalog Code:   lisinopril ; Order Dt/Tm:   6/3/2019 11:32:55 AM CDT          metFORMIN  :   metFORMIN ; Status:   Prescribed ; Ordered As Mnemonic:   metFORMIN 500 mg oral tablet, extended release ; Simple Display Line:   2 tab(s), Oral, bid, 360 tab(s), 1 Refill(s) ; Ordering Provider:   Timoteo Neil MD; Catalog Code:   metFORMIN ; Order Dt/Tm:   6/3/2019 11:32:55 AM CDT
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
## 2022-02-15 NOTE — NURSING NOTE
Comprehensive Intake Entered On:  6/30/2019 8:48 AM CDT    Performed On:  6/30/2019 8:44 AM CDT by Rubi Love CMA               Summary   Chief Complaint :   patient here today with sore throat x 1 week. Left eye is itchy, red and watery present for 3 days.    Weight Measured :   210.6 lb(Converted to: 210 lb 10 oz, 95.53 kg)    Systolic Blood Pressure :   138 mmHg (HI)    Diastolic Blood Pressure :   68 mmHg   Mean Arterial Pressure :   91 mmHg   Peripheral Pulse Rate :   71 bpm   BP Site :   Right arm   BP Method :   Manual   HR Method :   Electronic   Temperature Tympanic :   97.1 DegF(Converted to: 36.2 DegC)  (LOW)    Oxygen Saturation :   98 %   Rubi Love CMA - 6/30/2019 8:44 AM CDT   Health Status   Allergies Verified? :   Yes   Medication History Verified? :   Yes   Pre-Visit Planning Status :   N/A   Tobacco Use? :   Never smoker   Rubi Love CMA - 6/30/2019 8:44 AM CDT   Consents   Consent for Immunization Exchange :   Consent Granted   Consent for Immunizations to Providers :   Consent Granted   Rubi Love CMA - 6/30/2019 8:44 AM CDT   Meds / Allergies   (As Of: 6/30/2019 8:48:51 AM CDT)   Allergies (Active)   No Known Medication Allergies  Estimated Onset Date:   Unspecified ; Created By:   Rubi Love CMA; Reaction Status:   Active ; Category:   Drug ; Substance:   No Known Medication Allergies ; Type:   Allergy ; Updated By:   Rubi Love CMA; Reviewed Date:   6/30/2019 8:47 AM CDT        Medication List   (As Of: 6/30/2019 8:48:51 AM CDT)   Prescription/Discharge Order    atorvastatin  :   atorvastatin ; Status:   Prescribed ; Ordered As Mnemonic:   atorvastatin 40 mg oral tablet ; Simple Display Line:   40 mg, 1 tab(s), po, daily, 90 tab(s), 1 Refill(s) ; Ordering Provider:   Timoteo Neil MD; Catalog Code:   atorvastatin ; Order Dt/Tm:   6/3/2019 11:32:54 AM          lisinopril  :   lisinopril ; Status:   Prescribed ; Ordered As Mnemonic:   lisinopril 10 mg oral tablet ; Simple Display  Line:   See Instructions, TAKE 1 TABLET EVERY DAY, 90 tab(s), 1 Refill(s) ; Ordering Provider:   Timoteo Neil MD; Catalog Code:   lisinopril ; Order Dt/Tm:   6/3/2019 11:32:55 AM          metFORMIN  :   metFORMIN ; Status:   Prescribed ; Ordered As Mnemonic:   metFORMIN 500 mg oral tablet, extended release ; Simple Display Line:   2 tab(s), Oral, bid, 360 tab(s), 1 Refill(s) ; Ordering Provider:   Timoteo Neil MD; Catalog Code:   metFORMIN ; Order Dt/Tm:   6/3/2019 11:32:55 AM   Plan; labs, urine, CT abd/pelvis, pain medicines, re eval

## 2022-02-15 NOTE — NURSING NOTE
Pre op exam faxed     TO:  [facility]    FAX: [phone]    DOS: 10/4/18    DR: Kenny    PROC: kidney stones

## 2022-02-15 NOTE — LETTER
(Inserted Image. Unable to display)   December 04, 2019        STEPHENMARCIE MICHAUD  [address] [place] [id number]        Dear STEPHEN,      Thank you for selecting [facility] for your healthcare needs.    Our records indicate you are due for the following services:    Diabetic Exam ~ Please bring your glucose meter and/or your blood glucose diary to your appointment.  Fasting Lab Tests ~ Please do not eat or drink anything 10 hours prior to your scheduled appointment time.  (Water and any medications that you may need are allowed unless directed otherwise.)    If you had your labs done at another facility or with [facility] at [facility], please bring in a copy of the results to your next visit, mail a copy, or drop off a copy of your results to your Healthcare Provider.    You are due for lab work and an office visit, please schedule the lab appointment 1 week before the office visit.  This will assure all results are available to discuss with your provider during your visit.    **It is very helpful if you bring your medication bottles to your appointment.  This assures we have all of your current medications, including strength and dosing information, documented accurately in your medical record.    To schedule an appointment or if you have further questions, please contact your primary clinic:   [facility]       [phone]   [facility]       [phone]              [facility]     [phone]      Powered by [employer]    Sincerely,    Timoteo Neil M.D.
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
## 2022-02-15 NOTE — NURSING NOTE
Diabetes Eye Testing Entered On:  1/22/2019 2:00 PM CST    Performed On:  1/22/2019 2:00 PM CST by Elizabeth Epstein CMA               Diabetes Eye Testing   Retinopathy Present TR :   No   Dilated Retinal Exam Date TR :   1/22/2019 CST   Elizabeth Epstein CMA - 1/22/2019 2:00 PM CST
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
## 2022-02-15 NOTE — LETTER
(Inserted Image. Unable to display)   [address] [place] [address]  October 08, 2021      STEPHEN MICHAUD  [address] [place] [id number]        Dear STEPHEN,     Thank you for selecting [facility] ([facility]viously [facility]) for your healthcare needs. Below you will find the results of your recent test(s) done at our clinic.      Labs all appearing stable.  PSA is trending up a bit compared to last year's reading.    I'm going to request we look at PSA levels again at 6 month follow-up.        Result Name Current Result Previous Result Reference Range   PSA (ng/mL)  3.67 10/6/2021  2.3 6/10/2020  - < OR = 4.00   Glucose Level (mg/dL) ((H)) 150 10/6/2021 ((H)) 144 9/30/2020 65 - 99   BUN (mg/dL)  18 10/6/2021  18 9/30/2020 7 - 25   Creatinine Level (mg/dL)  1.14 10/6/2021 ((H)) 1.21 9/30/2020 0.70 - 1.18   eGFR (mL/min/1.73m2)  63 10/6/2021 ((L)) 59 9/30/2020 > OR = 60 -    eGFR  (mL/min/1.73m2)  74 10/6/2021  69 9/30/2020 > OR = 60 -    BUN/Creat Ratio  NOT APPLICABLE 10/6/2021  15 9/30/2020 6 - 22   Sodium Level (mmol/L)  141 10/6/2021  142 9/30/2020 135 - 146   Potassium Level (mmol/L)  4.4 10/6/2021  4.8 9/30/2020 3.5 - 5.3   Chloride Level (mmol/L)  104 10/6/2021  104 9/30/2020 98 - 110   CO2 Level (mmol/L)  29 10/6/2021  29 9/30/2020 20 - 32   Calcium Level (mg/dL)  9.5 10/6/2021  9.4 10/6/2021 8.6 - 10.3   Cholesterol (mg/dL)  118 10/6/2021  125 9/30/2020  - <200   HDL (mg/dL) ((L)) 38 10/6/2021  42 9/30/2020 > OR = 40 -    Triglyceride (mg/dL) ((H)) 227 10/6/2021 ((H)) 168 9/30/2020  - <150   LDL  52 10/6/2021  58 9/30/2020    Cholesterol/HDL Ratio  3.1 10/6/2021  3.0 9/30/2020  - <5.0   Non-HDL Cholesterol  80 10/6/2021  83 9/30/2020  - <130   U Microalbumin (mg/dL)  0.5 10/6/2021  0.3 6/10/2020 See Note: -    Microalbumin Comment  See comment 10/6/2021     Hgb A1c ((H)) 7.1 10/6/2021 ((H)) 6.9 9/30/2020  - <5.7   PTH Intact (pg/mL)  62  10/6/2021  14 - 64   Calcium Level (mg/dL)  9.4 10/6/2021  9.5 10/6/2021 8.6 - 10.3       Please contact me or my assistant at [phone] if you have any questions or concerns.     Sincerely,        John Jeffers MD    What do your labs mean?  Below is a glossary of commonly ordered labs:  LDL - Bad Cholesterol  HDL - Good Cholesterol  AST/ALT - Liver Function  Cr/Creatinine - Kidney Function  Microalbumin - Kidney Function  BUN - Kidney Function  PSA - Prostate   TSH - Thyroid Hormone  HgbA1c - Diabetes Test  Hgb (Hemoglobin) - Red Blood Cells

## 2022-02-15 NOTE — NURSING NOTE
Quick Intake Entered On:  6/10/2020 9:08 AM CDT    Performed On:  6/10/2020 9:08 AM CDT by Katherin Vergara RN               Summary   Weight Measured :   207.6 lb(Converted to: 207 lb 10 oz, 94.17 kg)    Height Measured :   71 in(Converted to: 5 ft 11 in, 180.34 cm)    Body Mass Index :   28.95 kg/m2 (HI)    Body Surface Area :   2.17 m2   Systolic Blood Pressure :   129 mmHg   Diastolic Blood Pressure :   80 mmHg   Mean Arterial Pressure :   96 mmHg   Peripheral Pulse Rate :   56 bpm (LOW)    Temperature Tympanic :   96.2 DegF(Converted to: 35.7 DegC)  (LOW)    Katherin Vergara RN - 6/10/2020 9:08 AM CDT
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
## 2022-02-15 NOTE — NURSING NOTE
CAGE Assessment Entered On:  10/5/2020 10:11 AM CDT    Performed On:  10/5/2020 10:11 AM CDT by Elizabeth Epstein CMA               Assessment   Have you ever felt you should cut down on your drinking :   No   Have people annoyed you by criticizing your drinking :   No   Have you ever felt bad or guilty about your drinking :   No   Have you ever taken a drink first thing in the morning to steady your nerves or get rid of a hangover (Eye-opener) :   No   CAGE Score :   0    Elizabeth Epstein CMA - 10/5/2020 10:11 AM CDT

## 2022-02-15 NOTE — LETTER
(Inserted Image. Unable to display)   [facility] [address]  October 05, 2020      STEPHEN JASWANT  [address]  [place], [place] [id number]        Dear STEPHEN,     Thank you for selecting [facility] for your healthcare needs. Below you will find the results of the recent tests done at our clinic.        All results are within acceptable limits. No treatment changes are recommended at this time.      Result Name Current Result Previous Result Reference Range   Sodium Level (mmol/L)  142 9/30/2020  140 6/10/2020 135 - 146   Potassium Level (mmol/L)  4.8 9/30/2020  4.5 6/10/2020 3.5 - 5.3   Chloride Level (mmol/L)  104 9/30/2020  103 6/10/2020 98 - 110   CO2 Level (mmol/L)  29 9/30/2020  29 6/10/2020 20 - 32   Glucose Level (mg/dL) ((H)) 144 9/30/2020 ((H)) 129 6/10/2020 65 - 99   BUN (mg/dL)  18 9/30/2020  18 6/10/2020 7 - 25   Creatinine Level (mg/dL) ((H)) 1.21 9/30/2020 ((H)) 1.30 6/10/2020 0.70 - 1.18   BUN/Creat Ratio  15 9/30/2020  14 6/10/2020 6 - 22   eGFR (mL/min/1.73m2) ((L)) 59 9/30/2020 ((L)) 55 6/10/2020 > OR = 60 -    eGFR  (mL/min/1.73m2)  69 9/30/2020  64 6/10/2020 > OR = 60 -    Calcium Level (mg/dL)  9.6 9/30/2020  10.0 6/10/2020 8.6 - 10.3   Bilirubin Total (mg/dL)  0.8 9/30/2020  1.1 6/10/2020 0.2 - 1.2   Alkaline Phosphatase (unit/L)  79 9/30/2020  85 6/10/2020 35 - 144   AST/SGOT (unit/L)  18 9/30/2020  17 6/10/2020 10 - 35   ALT/SGPT (unit/L)  22 9/30/2020  21 6/10/2020 9 - 46   Protein Total (gm/dL)  6.5 9/30/2020  6.8 6/10/2020 6.1 - 8.1   Albumin Level (gm/dL)  4.5 9/30/2020  4.7 6/10/2020 3.6 - 5.1   Globulin  2.0 9/30/2020  2.1 6/10/2020 1.9 - 3.7   A/G Ratio  2.3 9/30/2020  2.2 6/10/2020 1.0 - 2.5   Hgb A1c ((H)) 6.9 9/30/2020 ((H)) 7.0 6/10/2020  - <5.7   Cholesterol (mg/dL)  125 9/30/2020  140 6/10/2020  - <200   Non-HDL Cholesterol  83 9/30/2020  100 6/10/2020  - <130   HDL (mg/dL)  42 9/30/2020  40 6/10/2020 > OR = 40 -     Cholesterol/HDL Ratio  3.0 9/30/2020  3.5 6/10/2020  - <5.0   LDL  58 9/30/2020  72 6/10/2020    Triglyceride (mg/dL) ((H)) 168 9/30/2020 ((H)) 179 6/10/2020  - <150   WBC  6.1 9/30/2020  5.3 6/10/2020 3.8 - 10.8   RBC  4.95 9/30/2020  5.00 6/10/2020 4.20 - 5.80   Hgb (gm/dL)  14.8 9/30/2020  15.1 6/10/2020 13.2 - 17.1   Hct (%)  43.5 9/30/2020  43.7 6/10/2020 38.5 - 50.0   MCV (fL)  87.9 9/30/2020  87.4 6/10/2020 80.0 - 100.0   MCH (pg)  29.9 9/30/2020  30.2 6/10/2020 27.0 - 33.0   MCHC (gm/dL)  34.0 9/30/2020  34.6 6/10/2020 32.0 - 36.0   RDW (%)  12.4 9/30/2020  12.6 6/10/2020 11.0 - 15.0   Platelet  158 9/30/2020 ((L)) 130 6/10/2020 140 - 400   MPV (fL)  12.5 9/30/2020  12.5 6/10/2020 7.5 - 12.5       Please contact my practice at [phone]  if you have any questions or concerns.     Sincerely,        Timoteo Neil MD          What do your labs mean?  Below is a glossary of commonly ordered labs:  LDL   Bad Cholesterol   HDL   Good Cholesterol  AST/ALT   Liver Function   Cr/Creatinine   Kidney Function  Microalbumin   Kidney Function  BUN   Kidney Function  PSA   Prostate    TSH   Thyroid Hormone  HgbA1c   Diabetes Test   Hgb (Hemoglobin)   Red Blood Cells

## 2022-02-15 NOTE — NURSING NOTE
Comprehensive Intake Entered On:  6/3/2019 9:11 AM CDT    Performed On:  6/3/2019 9:08 AM CDT by Elizabeth Epstein CMA               Summary   Chief Complaint :   Pt here for med ck   Weight Measured :   214 lb(Converted to: 214 lb 0 oz, 97.07 kg)    Height Measured :   71 in(Converted to: 5 ft 11 in, 180.34 cm)    Body Mass Index :   29.84 kg/m2 (HI)    Body Surface Area :   2.2 m2   Systolic Blood Pressure :   138 mmHg (HI)    Diastolic Blood Pressure :   82 mmHg (HI)    Mean Arterial Pressure :   101 mmHg   Peripheral Pulse Rate :   68 bpm   Oxygen Saturation :   98 %   Elizabeth Epstein CMA - 6/3/2019 9:08 AM CDT   Health Status   Allergies Verified? :   Yes   Medication History Verified? :   Yes   Medical History Verified? :   Yes   Pre-Visit Planning Status :   Completed   Tobacco Use? :   Never smoker   Elizabeth Epstein CMA - 6/3/2019 9:08 AM CDT   Consents   Consent for Immunization Exchange :   Consent Granted   Consent for Immunizations to Providers :   Consent Granted   Elizabeth Epstein CMA - 6/3/2019 9:08 AM CDT   Meds / Allergies   (As Of: 6/3/2019 9:11:19 AM CDT)   Allergies (Active)   No known allergies  Estimated Onset Date:   Unspecified ; Created By:   Coulmba Calderon; Reaction Status:   Active ; Category:   Drug ; Substance:   No known allergies ; Type:   Allergy ; Updated By:   Columba Calderon; Reviewed Date:   6/3/2019 9:10 AM CDT        Medication List   (As Of: 6/3/2019 9:11:19 AM CDT)   Prescription/Discharge Order    atorvastatin  :   atorvastatin ; Status:   Prescribed ; Ordered As Mnemonic:   atorvastatin 40 mg oral tablet ; Simple Display Line:   40 mg, 1 tab(s), po, daily, 90 tab(s), 1 Refill(s) ; Ordering Provider:   Timoteo Neil MD; Catalog Code:   atorvastatin ; Order Dt/Tm:   9/26/2018 10:05:44 AM          lisinopril  :   lisinopril ; Status:   Prescribed ; Ordered As Mnemonic:   lisinopril 10 mg oral tablet ; Simple Display Line:   See Instructions, TAKE 1 TABLET EVERY DAY, 90 tab(s), 1  Refill(s) ; Ordering Provider:   Timoteo Neil MD; Catalog Code:   lisinopril ; Order Dt/Tm:   9/26/2018 10:05:16 AM          metFORMIN  :   metFORMIN ; Status:   Prescribed ; Ordered As Mnemonic:   metFORMIN 500 mg oral tablet, extended release ; Simple Display Line:   2 tab(s), Oral, bid, 360 tab(s), 0 Refill(s) ; Ordering Provider:   Timoteo Neil MD; Catalog Code:   metFORMIN ; Order Dt/Tm:   12/20/2018 7:48:22 AM

## 2022-02-15 NOTE — NURSING NOTE
Medicare Visit Entered On:  10/6/2021 7:10 AM CDT    Performed On:  10/6/2021 7:02 AM CDT by Elise Mcconnell CMA               Summary   Chief Complaint :   AWV   Weight Measured :   206.1 lb(Converted to: 206 lb 2 oz, 93.485 kg)    Height Measured :   70.5 in(Converted to: 5 ft 10 in, 179.07 cm)    Body Mass Index :   29.15 kg/m2 (HI)    Body Surface Area :   2.15 m2   Systolic Blood Pressure :   146 mmHg (HI)    Diastolic Blood Pressure :   84 mmHg (HI)    Mean Arterial Pressure :   105 mmHg   Peripheral Pulse Rate :   59 bpm (LOW)    Oxygen Saturation :   99 %   Elise Mcconnell CMA - 10/6/2021 7:02 AM CDT   Health Status   Allergies Verified? :   Yes   Medication History Verified? :   Yes   Medical History Verified? :   Yes   Pre-Visit Planning Status :   Completed   Elise Mcconnell CMA - 10/6/2021 7:02 AM CDT   Social History   Social History   (As Of: 10/6/2021 7:10:56 AM CDT)   Alcohol:  Current      1-2 times per month   (Last Updated: 7/3/2014 8:48:31 AM CDT by Columba Calderon CMA)          Tobacco:  Denies Tobacco Use      Never   (Last Updated: 7/3/2014 8:47:46 AM CDT by Columba Calderon CMA)   Former smoker, quit more than 30 days ago, Cigarettes   Comments:  10/6/2021 7:04 AM - Elise Mcconnell CMA: smoked occ cigar/pipe for ~2yrs - no tobacco x 50yrs   (Last Updated: 10/6/2021 7:04:39 AM CDT by Elise Mcconnell CMA)          Electronic Cigarette/Vaping:        Electronic Cigarette Use: Never.   (Last Updated: 10/6/2021 7:04:45 AM CDT by Elise Mcconnell CMA)          Substance Abuse:  Denies Substance Abuse      (Last Updated: 8/5/2011 1:35:03 PM CDT by Columba Calderon CMA )         Employment/School:        Retired   (Last Updated: 3/14/2016 8:58:25 AM CDT by Timoteo Neil MD)          Home/Environment:        Marital status:  (Living together).  Lives with Self, Spouse.  2 children.   (Last Updated: 3/14/2016 8:58:45 AM CDT by Rashaad BOYCE, Timoteo)          Exercise:  Occasional exercise       Comments:  6/30/2010 8:19  AM - Columba Calderon CMA: working requires alot of activity   (Last Updated: 6/30/2010 8:19:10 AM CDT by Columba Calderon CMA)            Geriatric Depression Screening   Geriatric Depression Satisfied Life :   Yes   Geriatric Depression Dropped Activities :   Yes   Geriatric Depression Life Empty :   No   Geriatric Depression Bored :   No   Geriatric Depression Good Spirits :   Yes   Geriatric Depression Afraid Bad Things :   No   Geriatric Depression Feel Happy :   Yes   Geriatric Depression Feel Helpless :   No   Geriatric Depression Prefer to Stay Home :   No   Geriatric Depression Memory Problems :   No   Geriatric Depression Wonderful Be Alive :   Yes   Geriatric Depression Feel Worthless :   No   Geriatric Depression Situation Hopeless :   No   Geriatric Depression Others Better Off :   No   Geriatric Depression Full of Energy :   Yes   Geriatric Depression Total Score :   1    Elise Mcconnell CMA - 10/6/2021 7:02 AM CDT   Hearing and Vision Screening   Audiogram Result Right Ear :   Fail   Audiogram Result Left Ear :   Fail   Hearing Screen Comments :   missed at 4000hz inb both ears   Elise Mcconnell CMA - 10/6/2021 7:02 AM CDT   Home Safety Screen   Emergency Numbers Kept/Updated :   Yes   Aware of Smoking Dangers :   Yes   Smoke Alarms/Fire Extinguisher Available :   Yes   Household Members Fire Safety Knowledge :   Yes   Firearms Unloaded and Secure :   Yes   Floor Rugs Removed or Fastened :   No   Mats in Bathtub/Shower :   Yes   Stairway Rails or Banisters :   Yes   Outdoor Clutter Safety :   Yes   Indoor Clutter Safety :   Yes   Electrical Cord Safety :   No   Elise Mcconnell CMA - 10/6/2021 7:02 AM CDT   Advance Directives   Advance Directive :   No   Elise Mcconnell CMA - 10/6/2021 7:02 AM CDT   Mahmood Fall Risk   History of Falls in Last 3 Months Mahmood :   No   Elise Mcconnell CMA - 10/6/2021 7:02 AM CDT   Functional Assessment   Focused Functional Assessment Grid   Bathing :   Independent (2)   Dressing :    Independent (2)   Toileting :   Independent (2)   Transferring Bed or Chair :   Independent (2)   Feeding :   Independent (2)   Elise Mcconnell CMA - 10/6/2021 7:02 AM CDT   Capable of Shopping :   Yes   Capable of Walking :   Yes   Capable of Housekeeping :   Yes   Capable of Managing Medications :   Yes   Capable of Handling Finances :   Yes   Elise Mcconnell CMA - 10/6/2021 7:02 AM CDT

## 2022-02-15 NOTE — LETTER
(Inserted Image. Unable to display)   [facility] [address]  May 29, 2019      STEPHEN JASWANT  [address] [place] [phone]        Dear STEPHEN,     Thank you for selecting [facility] for your healthcare needs. Below you will find the results of the recent tests done at our clinic.        All results are within acceptable limits. No treatment changes are recommended at this time.      Result Name Current Result Previous Result Reference Range   Sodium Level (mmol/L)  138 5/28/2019  141 9/19/2018 135 - 146   Potassium Level (mmol/L)  4.1 5/28/2019  4.2 9/19/2018 3.5 - 5.3   Chloride Level (mmol/L)  101 5/28/2019  104 9/19/2018 98 - 110   CO2 Level (mmol/L)  27 5/28/2019  31 9/19/2018 20 - 32   Glucose Level (mg/dL) ((H)) 171 5/28/2019 ((H)) 146 9/19/2018 65 - 99   BUN (mg/dL)  16 5/28/2019  17 9/19/2018 7 - 25   Creatinine Level (mg/dL) ((H)) 1.19 5/28/2019  1.16 9/19/2018 0.70 - 1.18   BUN/Creat Ratio  13 5/28/2019  NOT APPLICABLE 9/19/2018 6 - 22   eGFR (mL/min/1.73m2)  62 5/28/2019  63 9/19/2018 > OR = 60 -    eGFR  (mL/min/1.73m2)  71 5/28/2019  74 9/19/2018 > OR = 60 -    Calcium Level (mg/dL)  9.6 5/28/2019  9.6 9/19/2018 8.6 - 10.3   Bilirubin Total (mg/dL)  0.9 5/28/2019  1.0 9/19/2018 0.2 - 1.2   Alkaline Phosphatase (unit/L)  72 5/28/2019  78 9/19/2018 40 - 115   AST/SGOT (unit/L)  18 5/28/2019  17 9/19/2018 10 - 35   ALT/SGPT (unit/L)  25 5/28/2019  20 9/19/2018 9 - 46   Protein Total (gm/dL)  6.9 5/28/2019  6.6 9/19/2018 6.1 - 8.1   Albumin Level (gm/dL)  4.7 5/28/2019  4.5 9/19/2018 3.6 - 5.1   Globulin  2.2 5/28/2019  2.1 9/19/2018 1.9 - 3.7   A/G Ratio  2.1 5/28/2019  2.1 9/19/2018 1.0 - 2.5   Hgb A1c ((H)) 7.2 5/28/2019 ((H)) 6.8 9/19/2018  - <5.7   Cholesterol (mg/dL)  161 5/28/2019  130 9/19/2018  - <200   Non-HDL Cholesterol  110 5/28/2019  95 9/19/2018  - <130   HDL (mg/dL)  51 5/28/2019 ((L)) 35 9/19/2018 >40 -     Cholesterol/HDL Ratio  3.2 5/28/2019  3.7 9/19/2018  - <5.0   LDL  83 5/28/2019  68 9/19/2018    Triglyceride (mg/dL) ((H)) 173 5/28/2019 ((H)) 206 9/19/2018  - <150   PSA (ng/mL)  2.2 5/28/2019  1.5 3/19/2018  - < OR = 4.0   WBC  5.5 5/28/2019  5.9 9/19/2018 3.8 - 10.8   RBC  4.98 5/28/2019  4.77 9/19/2018 4.20 - 5.80   Hgb (gm/dL)  14.8 5/28/2019  13.6 9/19/2018 13.2 - 17.1   Hct (%)  43.5 5/28/2019  41.5 9/19/2018 38.5 - 50.0   MCV (fL)  87.3 5/28/2019  87.0 9/19/2018 80.0 - 100.0   MCH (pg)  29.7 5/28/2019  28.5 9/19/2018 27.0 - 33.0   MCHC (gm/dL)  34.0 5/28/2019  32.8 9/19/2018 32.0 - 36.0   RDW (%)  12.6 5/28/2019  12.3 9/19/2018 11.0 - 15.0   Platelet  144 5/28/2019 ((L)) 139 9/19/2018 140 - 400   MPV (fL) ((H)) 12.7 5/28/2019 ((H)) 12.9 9/19/2018 7.5 - 12.5       Please contact my practice at [phone]  if you have any questions or concerns.     Sincerely,        Timoteo Neil MD          What do your labs mean?  Below is a glossary of commonly ordered labs:  LDL   Bad Cholesterol   HDL   Good Cholesterol  AST/ALT   Liver Function   Cr/Creatinine   Kidney Function  Microalbumin   Kidney Function  BUN   Kidney Function  PSA   Prostate    TSH   Thyroid Hormone  HgbA1c   Diabetes Test   Hgb (Hemoglobin)   Red Blood Cells
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
## 2022-07-21 DIAGNOSIS — I10 HTN (HYPERTENSION): ICD-10-CM

## 2022-07-21 DIAGNOSIS — E11.9 DIABETES MELLITUS, TYPE 2 (H): ICD-10-CM

## 2022-07-21 DIAGNOSIS — E78.5 HLD (HYPERLIPIDEMIA): Primary | ICD-10-CM

## 2022-07-21 RX ORDER — ATORVASTATIN CALCIUM 40 MG/1
TABLET, FILM COATED ORAL
Qty: 90 TABLET | Refills: 0 | Status: SHIPPED | OUTPATIENT
Start: 2022-07-21 | End: 2022-10-12

## 2022-07-21 RX ORDER — LISINOPRIL 10 MG/1
TABLET ORAL
Qty: 90 TABLET | Refills: 0 | Status: SHIPPED | OUTPATIENT
Start: 2022-07-21 | End: 2022-09-30

## 2022-07-21 RX ORDER — METFORMIN HCL 500 MG
TABLET, EXTENDED RELEASE 24 HR ORAL
Qty: 360 TABLET | Refills: 0 | Status: SHIPPED | OUTPATIENT
Start: 2022-07-21 | End: 2022-10-12

## 2022-07-21 NOTE — TELEPHONE ENCOUNTER
Rekha refill; due for annual 10/2022  Last Written Prescription Date: 10/7/21  Last Fill Quantity: 90 days,  # refills: 1   Last office visit: 10/6/21

## 2022-09-27 ENCOUNTER — DOCUMENTATION ONLY (OUTPATIENT)
Dept: LAB | Facility: CLINIC | Age: 72
End: 2022-09-27

## 2022-09-27 DIAGNOSIS — E11.9 TYPE 2 DIABETES MELLITUS WITHOUT COMPLICATION, WITHOUT LONG-TERM CURRENT USE OF INSULIN (H): ICD-10-CM

## 2022-09-27 DIAGNOSIS — Z12.5 SCREENING FOR PROSTATE CANCER: Primary | ICD-10-CM

## 2022-09-29 ENCOUNTER — LAB (OUTPATIENT)
Dept: LAB | Facility: CLINIC | Age: 72
End: 2022-09-29
Payer: COMMERCIAL

## 2022-09-29 ENCOUNTER — ALLIED HEALTH/NURSE VISIT (OUTPATIENT)
Dept: FAMILY MEDICINE | Facility: CLINIC | Age: 72
End: 2022-09-29

## 2022-09-29 VITALS — SYSTOLIC BLOOD PRESSURE: 145 MMHG | DIASTOLIC BLOOD PRESSURE: 82 MMHG | HEART RATE: 54 BPM

## 2022-09-29 DIAGNOSIS — I10 HTN (HYPERTENSION): ICD-10-CM

## 2022-09-29 DIAGNOSIS — I10 ESSENTIAL HYPERTENSION: ICD-10-CM

## 2022-09-29 DIAGNOSIS — E11.9 TYPE 2 DIABETES MELLITUS WITHOUT COMPLICATION, WITHOUT LONG-TERM CURRENT USE OF INSULIN (H): ICD-10-CM

## 2022-09-29 DIAGNOSIS — Z01.30 BLOOD PRESSURE CHECK: Primary | ICD-10-CM

## 2022-09-29 DIAGNOSIS — Z12.5 SCREENING FOR PROSTATE CANCER: ICD-10-CM

## 2022-09-29 LAB
ANION GAP SERPL CALCULATED.3IONS-SCNC: 10 MMOL/L (ref 7–15)
BUN SERPL-MCNC: 15.8 MG/DL (ref 8–23)
CALCIUM SERPL-MCNC: 9.3 MG/DL (ref 8.8–10.2)
CHLORIDE SERPL-SCNC: 100 MMOL/L (ref 98–107)
CHOLEST SERPL-MCNC: 128 MG/DL
CREAT SERPL-MCNC: 1.12 MG/DL (ref 0.67–1.17)
CREAT UR-MCNC: 114 MG/DL
DEPRECATED HCO3 PLAS-SCNC: 28 MMOL/L (ref 22–29)
GFR SERPL CREATININE-BSD FRML MDRD: 69 ML/MIN/1.73M2
GLUCOSE SERPL-MCNC: 152 MG/DL (ref 70–99)
HBA1C MFR BLD: 7.1 % (ref 0–5.6)
HDLC SERPL-MCNC: 42 MG/DL
LDLC SERPL CALC-MCNC: 61 MG/DL
MICROALBUMIN UR-MCNC: <12 MG/L
MICROALBUMIN/CREAT UR: NORMAL MG/G{CREAT}
NONHDLC SERPL-MCNC: 86 MG/DL
POTASSIUM SERPL-SCNC: 4.3 MMOL/L (ref 3.4–5.3)
PSA SERPL-MCNC: 4.93 NG/ML (ref 0–6.5)
SODIUM SERPL-SCNC: 138 MMOL/L (ref 136–145)
TRIGL SERPL-MCNC: 123 MG/DL

## 2022-09-29 PROCEDURE — G0103 PSA SCREENING: HCPCS

## 2022-09-29 PROCEDURE — 36415 COLL VENOUS BLD VENIPUNCTURE: CPT

## 2022-09-29 PROCEDURE — 82043 UR ALBUMIN QUANTITATIVE: CPT

## 2022-09-29 PROCEDURE — 83036 HEMOGLOBIN GLYCOSYLATED A1C: CPT

## 2022-09-29 PROCEDURE — 80048 BASIC METABOLIC PNL TOTAL CA: CPT

## 2022-09-29 PROCEDURE — 80061 LIPID PANEL: CPT

## 2022-09-29 PROCEDURE — 99207 PR NO CHARGE NURSE ONLY: CPT

## 2022-09-29 NOTE — PROGRESS NOTES
I met with Mannysterling Sarabia at the request of MALLORY ECHAVARRIA MD  to recheck his blood pressure.  Blood pressure medications on the med list were reviewed with patient.    Patient has taken all medications as per usual regimen: Yes  Patient reports tolerating them without any issues or concerns: Yes    Vitals:    09/29/22 0725 09/29/22 0732   BP: (!) 153/78 (!) 145/82   Pulse: 56 54       After 5 minutes, the patient's blood pressure remained greater than or equal to 140/90.    Is the patient currently having any chest pain? No  Does the patient currently have a headache? No  Does the patient currently have any vision changes? No  Does the patient currently have any nausea? No  Does the patient currently have any abdominal pain? No    The previous encounter was reviewed.  The patient was discharged and the note will be sent to the provider for final review.

## 2022-09-30 RX ORDER — LISINOPRIL 20 MG/1
10 TABLET ORAL DAILY
Qty: 90 TABLET | Refills: 3 | Status: SHIPPED | OUTPATIENT
Start: 2022-09-30 | End: 2022-10-12

## 2022-09-30 NOTE — PROGRESS NOTES
Please contact patient and have him increase his lisinopril from 10mg to 20mg daily.  I've changed Ex under this encounter

## 2022-10-03 NOTE — PROGRESS NOTES
Patient updated regarding provider's recommendation/orders.    Miah Trotter LPN on 10/3/2022 at 9:41 AM

## 2022-10-12 ENCOUNTER — OFFICE VISIT (OUTPATIENT)
Dept: FAMILY MEDICINE | Facility: CLINIC | Age: 72
End: 2022-10-12
Payer: COMMERCIAL

## 2022-10-12 VITALS
HEIGHT: 71 IN | OXYGEN SATURATION: 98 % | BODY MASS INDEX: 27.86 KG/M2 | DIASTOLIC BLOOD PRESSURE: 70 MMHG | HEART RATE: 65 BPM | TEMPERATURE: 97.3 F | WEIGHT: 199 LBS | SYSTOLIC BLOOD PRESSURE: 122 MMHG

## 2022-10-12 DIAGNOSIS — N18.31 STAGE 3A CHRONIC KIDNEY DISEASE (H): ICD-10-CM

## 2022-10-12 DIAGNOSIS — E11.69 DIABETES MELLITUS TYPE 2 IN OBESE: Primary | ICD-10-CM

## 2022-10-12 DIAGNOSIS — Z00.00 HEALTHCARE MAINTENANCE: ICD-10-CM

## 2022-10-12 DIAGNOSIS — E11.9 TYPE 2 DIABETES MELLITUS WITHOUT COMPLICATION, WITHOUT LONG-TERM CURRENT USE OF INSULIN (H): ICD-10-CM

## 2022-10-12 DIAGNOSIS — I10 PRIMARY HYPERTENSION: ICD-10-CM

## 2022-10-12 DIAGNOSIS — I10 ESSENTIAL HYPERTENSION: ICD-10-CM

## 2022-10-12 DIAGNOSIS — Z85.820 HISTORY OF MELANOMA: ICD-10-CM

## 2022-10-12 DIAGNOSIS — N20.0 RECURRENT KIDNEY STONES: ICD-10-CM

## 2022-10-12 DIAGNOSIS — E66.9 DIABETES MELLITUS TYPE 2 IN OBESE: Primary | ICD-10-CM

## 2022-10-12 DIAGNOSIS — Z00.00 ENCOUNTER FOR MEDICARE ANNUAL WELLNESS EXAM: ICD-10-CM

## 2022-10-12 PROCEDURE — G0439 PPPS, SUBSEQ VISIT: HCPCS | Performed by: INTERNAL MEDICINE

## 2022-10-12 PROCEDURE — 99214 OFFICE O/P EST MOD 30 MIN: CPT | Mod: 25 | Performed by: INTERNAL MEDICINE

## 2022-10-12 RX ORDER — METFORMIN HCL 500 MG
1000 TABLET, EXTENDED RELEASE 24 HR ORAL 2 TIMES DAILY
Qty: 360 TABLET | Refills: 3 | Status: SHIPPED | OUTPATIENT
Start: 2022-10-12 | End: 2023-10-10

## 2022-10-12 RX ORDER — LISINOPRIL 20 MG/1
20 TABLET ORAL DAILY
Qty: 90 TABLET | Refills: 3 | Status: SHIPPED | OUTPATIENT
Start: 2022-10-12 | End: 2023-10-10

## 2022-10-12 RX ORDER — ATORVASTATIN CALCIUM 40 MG/1
40 TABLET, FILM COATED ORAL DAILY
Qty: 90 TABLET | Refills: 3 | Status: SHIPPED | OUTPATIENT
Start: 2022-10-12 | End: 2023-10-10

## 2022-10-12 ASSESSMENT — ENCOUNTER SYMPTOMS
EYE PAIN: 0
JOINT SWELLING: 0
ABDOMINAL PAIN: 0
SORE THROAT: 0
HEARTBURN: 0
WEAKNESS: 0
FEVER: 0
CHILLS: 0
HEMATOCHEZIA: 0
FREQUENCY: 0
HEMATURIA: 0
MYALGIAS: 0
DIARRHEA: 0
PARESTHESIAS: 0
NAUSEA: 0
PALPITATIONS: 0
NERVOUS/ANXIOUS: 0
HEADACHES: 0
ARTHRALGIAS: 0
CONSTIPATION: 0
COUGH: 0
DIZZINESS: 0
SHORTNESS OF BREATH: 0
DYSURIA: 0

## 2022-10-12 ASSESSMENT — ACTIVITIES OF DAILY LIVING (ADL): CURRENT_FUNCTION: NO ASSISTANCE NEEDED

## 2022-10-12 NOTE — ASSESSMENT & PLAN NOTE
recurrent kidney stones (he recalls these as calcium oxalate)  - last symptomatic episode in '18  - talked about general stone prevention strategies including daily urine volume goal of 2-2.5L/day, general reduction in dietary sodium and animal protein

## 2022-10-12 NOTE — ASSESSMENT & PLAN NOTE
controlled (historically)  current antihypertensive regimen: lisinopril 20mg daily  regimen changes: none  intolerance:  future titration/work-up plan:   bp goal <130/80 - consider further regimen optimization

## 2022-10-12 NOTE — ASSESSMENT & PLAN NOTE
- continue with PSA screening until age 75  - CRC due in '24 (historically has been normal)  - completed COVID vaccination + boosters  - recommending Shingrix vaccination in the future

## 2022-10-12 NOTE — PATIENT INSTRUCTIONS
Patient Education   Personalized Prevention Plan  You are due for the preventive services outlined below.  Your care team is available to assist you in scheduling these services.  If you have already completed any of these items, please share that information with your care team to update in your medical record.  Health Maintenance Due   Topic Date Due     Diabetic Foot Exam  Never done     ANNUAL REVIEW OF HM ORDERS  Never done     Discuss Advance Care Planning  Never done     Eye Exam  Never done     Hepatitis C Screening  Never done     Zoster (Shingles) Vaccine (1 of 2) Never done     AORTIC ANEURYSM SCREENING (SYSTEM ASSIGNED)  Never done     Annual Wellness Visit  10/06/2022

## 2022-10-12 NOTE — ASSESSMENT & PLAN NOTE
controlled  hypoglycemic medications: metformin ER 1000mg BID  insulin therapy: none  last HbA1c: 7.1%  microvascular complications: none  macrovascular complications: CAD (diffuse, non-atherostenotic disease; [facility] in '15)  ASA/ABHIJEET/statin: lisinopril 20mg daily, atorvastatin 40mg qhs   - advised to retrial aspirin 81mg daily

## 2022-10-12 NOTE — PROGRESS NOTES
"SUBJECTIVE:   Manny is a 72 year old who presents for Preventive Visit. Generally doing.  Stays active with fishing.  Tolerates meds without issues,.      Patient has been advised of split billing requirements and indicates understanding: Yes  Are you in the first 12 months of your Medicare coverage?  No    Healthy Habits:     In general, how would you rate your overall health?  Good    Frequency of exercise:  6-7 days/week    Duration of exercise:  Other    Do you usually eat at least 4 servings of fruit and vegetables a day, include whole grains    & fiber and avoid regularly eating high fat or \"junk\" foods?  Yes    Taking medications regularly:  Yes    Medication side effects:  None    Ability to successfully perform activities of daily living:  No assistance needed    Home Safety:  Lack of grab bars in the bathroom and lack of handrails on stairs    Hearing Impairment:  Difficulty following a conversation in a noisy restaurant or crowded room    In the past 6 months, have you been bothered by leaking of urine?  No    In general, how would you rate your overall mental or emotional health?  Excellent      PHQ-2 Total Score: 0    Additional concerns today:  No    Do you feel safe in your environment? Yes    Have you ever done Advance Care Planning? (For example, a Health Directive, POLST, or a discussion with a medical provider or your loved ones about your wishes): Yes, advance care planning is on file.        Hearing Assessment: not done-- using hearing aides  Fall risk  Fallen 2 or more times in the past year?: No  Any fall with injury in the past year?: No    Cognitive Screening     }NORMAL  3) 3 item recall: }Recalls 3 objects  Results: 3 items recalled: COGNITIVE IMPAIRMENT LESS LIKELY    Mini-CogTM Copyright MOUSTAPHA Vega. Licensed by the author for use in [facility]; reprinted with permission (corin@.[facility]). All rights reserved.      Do you have sleep apnea, excessive snoring or daytime drowsiness?: " no    Reviewed and updated as needed this visit by clinical staff   Tobacco  Allergies  Meds              Reviewed and updated as needed this visit by Provider                 Social History     Tobacco Use     Smoking status: Never     Smokeless tobacco: Never   Substance Use Topics     Alcohol use: Yes     Comment: Alcoholic Drinks/day: 1-2 mon       Alcohol Use 10/12/2022   Prescreen: >3 drinks/day or >7 drinks/week? No         Current providers sharing in care for this patient include:   Patient Care Team:  Jesse Jeffers MD as PCP - General (Internal Medicine)  Jesse Jeffers MD as Assigned PCP    The following health maintenance items are reviewed in Epic and correct as of today:  Health Maintenance   Topic Date Due     DIABETIC FOOT EXAM  Never done     ANNUAL REVIEW OF HM ORDERS  Never done     ADVANCE CARE PLANNING  Never done     EYE EXAM  Never done     HEPATITIS C SCREENING  Never done     ZOSTER IMMUNIZATION (1 of 2) Never done     AORTIC ANEURYSM SCREENING (SYSTEM ASSIGNED)  Never done     MEDICARE ANNUAL WELLNESS VISIT  10/06/2022     A1C  12/29/2022     BMP  09/29/2023     LIPID  09/29/2023     MICROALBUMIN  09/29/2023     FALL RISK ASSESSMENT  10/12/2023     COLORECTAL CANCER SCREENING  08/20/2024     DTAP/TDAP/TD IMMUNIZATION (5 - Td or Tdap) 05/17/2026     PHQ-2 (once per calendar year)  Completed     INFLUENZA VACCINE  Completed     Pneumococcal Vaccine: 65+ Years  Completed     COVID-19 Vaccine  Completed     IPV IMMUNIZATION  Aged Out     MENINGITIS IMMUNIZATION  Aged Out             Review of Systems   Constitutional: Negative for chills and fever.   HENT: Negative for congestion, ear pain, hearing loss and sore throat.    Eyes: Negative for pain and visual disturbance.   Respiratory: Negative for cough and shortness of breath.    Cardiovascular: Negative for chest pain, palpitations and peripheral edema.   Gastrointestinal: Negative for abdominal pain, constipation, diarrhea,  "heartburn, hematochezia and nausea.   Genitourinary: Negative for dysuria, frequency, genital sores, hematuria, impotence, penile discharge and urgency.   Musculoskeletal: Negative for arthralgias, joint swelling and myalgias.   Skin: Negative for rash.   Neurological: Negative for dizziness, weakness, headaches and paresthesias.   Psychiatric/Behavioral: Negative for mood changes. The patient is not nervous/anxious.          OBJECTIVE:   /70 (BP Location: Right arm)   Pulse 65   Temp 97.3  F (36.3  C) (Tympanic)   Ht 1.791 m (5' 10.5\")   Wt 90.3 kg (199 lb)   SpO2 98%   BMI 28.15 kg/m   Estimated body mass index is 28.15 kg/m  as calculated from the following:    Height as of this encounter: 1.791 m (5' 10.5\").    Weight as of this encounter: 90.3 kg (199 lb).  Physical Exam  GENERAL: healthy, alert and no distress  NECK: no adenopathy, no asymmetry, masses, or scars and thyroid normal to palpation  RESP: lungs clear to auscultation - no rales, rhonchi or wheezes  CV: regular rate and rhythm, normal S1 S2, no S3 or S4, no murmur, click or rub, no peripheral edema and peripheral pulses strong  ABDOMEN: soft, nontender, no hepatosplenomegaly, no masses and bowel sounds normal  MS: no gross musculoskeletal defects noted, no edema    Diagnostic Test Results:  Labs reviewed in Epic    ASSESSMENT / PLAN:     Problem List Items Addressed This Visit        Endocrine    Type 2 diabetes mellitus without complication, without long-term current use of insulin (H) - Primary     controlled  hypoglycemic medications: metformin ER 1000mg BID  insulin therapy: none  last HbA1c: 7.1%  microvascular complications: none  macrovascular complications: CAD (diffuse, non-atherostenotic disease; [facility] in '15)  ASA/ABHIJEET/statin: lisinopril 20mg daily, atorvastatin 40mg qhs   - advised to retrial aspirin 81mg daily         Relevant Medications    atorvastatin (LIPITOR) 40 MG tablet    metFORMIN (GLUCOPHAGE XR) 500 MG 24 hr tablet    " "   Circulatory    Primary hypertension     controlled (historically)  current antihypertensive regimen: lisinopril 20mg daily  regimen changes: none  intolerance:  future titration/work-up plan:   bp goal <130/80 - consider further regimen optimization         Relevant Medications    lisinopril (ZESTRIL) 20 MG tablet       Urinary    Stage 3a chronic kidney disease (H)    Recurrent kidney stones     recurrent kidney stones (he recalls these as calcium oxalate)  - last symptomatic episode in '18  - talked about general stone prevention strategies including daily urine volume goal of 2-2.5L/day, general reduction in dietary sodium and animal protein             Other    History of melanoma     h/o melanoma (resection from scalp)  - follows with dermatology         Healthcare maintenance     - continue with PSA screening until age 75  - CRC due in '24 (historically has been normal)  - completed COVID vaccination + boosters  - recommending Shingrix vaccination in the future        Other Visit Diagnoses     Encounter for Medicare annual wellness exam        Essential hypertension        Relevant Medications    lisinopril (ZESTRIL) 20 MG tablet          COUNSELING:  Reviewed preventive health counseling, as reflected in patient instructions       Consider AAA screening for ages 65-75 and smoking history       Regular exercise       Immunizations               Colon cancer screening       Prostate cancer screening    Estimated body mass index is 28.15 kg/m  as calculated from the following:    Height as of this encounter: 1.791 m (5' 10.5\").    Weight as of this encounter: 90.3 kg (199 lb).        He reports that he has never smoked. He has never used smokeless tobacco.      Appropriate preventive services were discussed with this patient, including applicable screening as appropriate for cardiovascular disease, diabetes, osteopenia/osteoporosis, and glaucoma.  As appropriate for age/gender, discussed screening for " colorectal cancer, prostate cancer, breast cancer, and cervical cancer. Checklist reviewing preventive services available has been given to the patient.    Reviewed patients plan of care and provided an AVS. The Basic Care Plan (routine screening as documented in Health Maintenance) for Manny meets the Care Plan requirement. This Care Plan has been established and reviewed with the Patient.    Counseling Resources:  ATP IV Guidelines  Pooled Cohorts Equation Calculator  Breast Cancer Risk Calculator  Breast Cancer: Medication to Reduce Risk  FRAX Risk Assessment  ICSI Preventive Guidelines  Dietary Guidelines for Americans, 2010  USDA's MyPlate  ASA Prophylaxis  Lung CA Screening    Jesse Jeffers MD  [facility]    Identified Health Risks:

## 2023-01-31 ENCOUNTER — TRANSFERRED RECORDS (OUTPATIENT)
Dept: HEALTH INFORMATION MANAGEMENT | Facility: CLINIC | Age: 73
End: 2023-01-31

## 2023-01-31 LAB — RETINOPATHY: NEGATIVE

## 2023-04-23 NOTE — TELEPHONE ENCOUNTER
---------------------  From: Sharona Pimentel   Sent: 6/18/2020 11:03:30 AM CDT  Subject: lab results.      Pt called wondering about results for lyme disease lab test. Called him and informed him of negative results. He expressed understanding and had no further questions.  
Negative Screen

## 2023-10-07 DIAGNOSIS — E11.69 DIABETES MELLITUS TYPE 2 IN OBESE: ICD-10-CM

## 2023-10-07 DIAGNOSIS — E66.9 DIABETES MELLITUS TYPE 2 IN OBESE: ICD-10-CM

## 2023-10-07 DIAGNOSIS — I10 ESSENTIAL HYPERTENSION: ICD-10-CM

## 2023-10-10 RX ORDER — ATORVASTATIN CALCIUM 40 MG/1
40 TABLET, FILM COATED ORAL DAILY
Qty: 90 TABLET | Refills: 3 | Status: SHIPPED | OUTPATIENT
Start: 2023-10-10

## 2023-10-10 RX ORDER — METFORMIN HCL 500 MG
1000 TABLET, EXTENDED RELEASE 24 HR ORAL 2 TIMES DAILY
Qty: 360 TABLET | Refills: 3 | Status: SHIPPED | OUTPATIENT
Start: 2023-10-10

## 2023-10-10 RX ORDER — LISINOPRIL 20 MG/1
20 TABLET ORAL DAILY
Qty: 90 TABLET | Refills: 3 | Status: SHIPPED | OUTPATIENT
Start: 2023-10-10

## 2024-01-03 ENCOUNTER — LAB (OUTPATIENT)
Dept: FAMILY MEDICINE | Facility: CLINIC | Age: 74
End: 2024-01-03

## 2024-01-03 ENCOUNTER — OFFICE VISIT (OUTPATIENT)
Dept: FAMILY MEDICINE | Facility: CLINIC | Age: 74
End: 2024-01-03
Payer: COMMERCIAL

## 2024-01-03 VITALS
OXYGEN SATURATION: 97 % | HEIGHT: 71 IN | TEMPERATURE: 98.1 F | HEART RATE: 62 BPM | WEIGHT: 202 LBS | SYSTOLIC BLOOD PRESSURE: 152 MMHG | BODY MASS INDEX: 28.28 KG/M2 | DIASTOLIC BLOOD PRESSURE: 88 MMHG | RESPIRATION RATE: 20 BRPM

## 2024-01-03 DIAGNOSIS — Z00.00 ENCOUNTER FOR MEDICARE ANNUAL WELLNESS EXAM: ICD-10-CM

## 2024-01-03 DIAGNOSIS — E11.9 TYPE 2 DIABETES MELLITUS WITHOUT COMPLICATION, WITHOUT LONG-TERM CURRENT USE OF INSULIN (H): ICD-10-CM

## 2024-01-03 DIAGNOSIS — Z85.820 HISTORY OF MELANOMA: ICD-10-CM

## 2024-01-03 DIAGNOSIS — N18.31 STAGE 3A CHRONIC KIDNEY DISEASE (H): ICD-10-CM

## 2024-01-03 DIAGNOSIS — Z12.11 SCREENING FOR COLORECTAL CANCER: ICD-10-CM

## 2024-01-03 DIAGNOSIS — Z00.00 HEALTHCARE MAINTENANCE: ICD-10-CM

## 2024-01-03 DIAGNOSIS — I10 PRIMARY HYPERTENSION: ICD-10-CM

## 2024-01-03 DIAGNOSIS — Z12.12 SCREENING FOR COLORECTAL CANCER: ICD-10-CM

## 2024-01-03 DIAGNOSIS — E66.9 DIABETES MELLITUS TYPE 2 IN OBESE: ICD-10-CM

## 2024-01-03 DIAGNOSIS — E11.69 DIABETES MELLITUS TYPE 2 IN OBESE: ICD-10-CM

## 2024-01-03 DIAGNOSIS — Z00.00 HEALTH CARE MAINTENANCE: ICD-10-CM

## 2024-01-03 DIAGNOSIS — Z12.5 SCREENING FOR PROSTATE CANCER: Primary | ICD-10-CM

## 2024-01-03 LAB
ALBUMIN UR-MCNC: NEGATIVE MG/DL
ANION GAP SERPL CALCULATED.3IONS-SCNC: 9 MMOL/L (ref 7–15)
APPEARANCE UR: CLEAR
BACTERIA #/AREA URNS HPF: ABNORMAL /HPF
BILIRUB UR QL STRIP: NEGATIVE
BUN SERPL-MCNC: 16.3 MG/DL (ref 8–23)
CALCIUM SERPL-MCNC: 9.8 MG/DL (ref 8.8–10.2)
CHLORIDE SERPL-SCNC: 101 MMOL/L (ref 98–107)
CHOLEST SERPL-MCNC: 150 MG/DL
COLOR UR AUTO: YELLOW
CREAT SERPL-MCNC: 1.27 MG/DL (ref 0.67–1.17)
CREAT UR-MCNC: 104 MG/DL
DEPRECATED HCO3 PLAS-SCNC: 29 MMOL/L (ref 22–29)
EGFRCR SERPLBLD CKD-EPI 2021: 59 ML/MIN/1.73M2
FASTING STATUS PATIENT QL REPORTED: YES
GLUCOSE SERPL-MCNC: 201 MG/DL (ref 70–99)
GLUCOSE UR STRIP-MCNC: 100 MG/DL
HBA1C MFR BLD: 8 % (ref 0–5.6)
HDLC SERPL-MCNC: 44 MG/DL
HGB BLD-MCNC: 15 G/DL (ref 13.3–17.7)
HGB UR QL STRIP: NEGATIVE
KETONES UR STRIP-MCNC: NEGATIVE MG/DL
LDLC SERPL CALC-MCNC: 71 MG/DL
LEUKOCYTE ESTERASE UR QL STRIP: NEGATIVE
MICROALBUMIN UR-MCNC: <12 MG/L
MICROALBUMIN/CREAT UR: NORMAL MG/G{CREAT}
NITRATE UR QL: NEGATIVE
NONHDLC SERPL-MCNC: 106 MG/DL
PH UR STRIP: 5.5 [PH] (ref 5–7)
POTASSIUM SERPL-SCNC: 5.2 MMOL/L (ref 3.4–5.3)
PSA SERPL DL<=0.01 NG/ML-MCNC: 5.94 NG/ML (ref 0–6.5)
RBC #/AREA URNS AUTO: ABNORMAL /HPF
SODIUM SERPL-SCNC: 139 MMOL/L (ref 135–145)
SP GR UR STRIP: 1.01 (ref 1–1.03)
SQUAMOUS #/AREA URNS AUTO: ABNORMAL /LPF
TRIGL SERPL-MCNC: 177 MG/DL
UROBILINOGEN UR STRIP-ACNC: 2 E.U./DL
WBC #/AREA URNS AUTO: ABNORMAL /HPF

## 2024-01-03 PROCEDURE — 80061 LIPID PANEL: CPT | Performed by: INTERNAL MEDICINE

## 2024-01-03 PROCEDURE — 82570 ASSAY OF URINE CREATININE: CPT | Performed by: INTERNAL MEDICINE

## 2024-01-03 PROCEDURE — 83036 HEMOGLOBIN GLYCOSYLATED A1C: CPT | Performed by: INTERNAL MEDICINE

## 2024-01-03 PROCEDURE — G0103 PSA SCREENING: HCPCS | Performed by: INTERNAL MEDICINE

## 2024-01-03 PROCEDURE — 99214 OFFICE O/P EST MOD 30 MIN: CPT | Mod: 25 | Performed by: INTERNAL MEDICINE

## 2024-01-03 PROCEDURE — 80048 BASIC METABOLIC PNL TOTAL CA: CPT | Performed by: INTERNAL MEDICINE

## 2024-01-03 PROCEDURE — 81001 URINALYSIS AUTO W/SCOPE: CPT | Performed by: INTERNAL MEDICINE

## 2024-01-03 PROCEDURE — 85018 HEMOGLOBIN: CPT | Mod: QW | Performed by: INTERNAL MEDICINE

## 2024-01-03 PROCEDURE — 82043 UR ALBUMIN QUANTITATIVE: CPT | Performed by: INTERNAL MEDICINE

## 2024-01-03 PROCEDURE — G0439 PPPS, SUBSEQ VISIT: HCPCS | Performed by: INTERNAL MEDICINE

## 2024-01-03 PROCEDURE — 36415 COLL VENOUS BLD VENIPUNCTURE: CPT | Performed by: INTERNAL MEDICINE

## 2024-01-03 RX ORDER — ASPIRIN 81 MG/1
81 TABLET ORAL DAILY
COMMUNITY
Start: 2024-01-03

## 2024-01-03 ASSESSMENT — ENCOUNTER SYMPTOMS
HEMATURIA: 0
PARESTHESIAS: 0
JOINT SWELLING: 0
EYE PAIN: 0
COUGH: 0
FEVER: 0
NERVOUS/ANXIOUS: 0
SHORTNESS OF BREATH: 0
DYSURIA: 0
HEMATOCHEZIA: 0
WEAKNESS: 0
ABDOMINAL PAIN: 0
NAUSEA: 0
CHILLS: 0
HEADACHES: 0
DIARRHEA: 0
HEARTBURN: 0
CONSTIPATION: 0
SORE THROAT: 1
MYALGIAS: 0
PALPITATIONS: 0
ARTHRALGIAS: 0
DIZZINESS: 0
FREQUENCY: 0

## 2024-01-03 ASSESSMENT — ACTIVITIES OF DAILY LIVING (ADL): CURRENT_FUNCTION: NO ASSISTANCE NEEDED

## 2024-01-03 NOTE — LETTER
January 7, 2024      Manny GLADYS [place]  [address] [address]        Dear ,    We are writing to inform you of your test results.    A1c is up a bit at 8%.  I recommend we should see you back in 6 months to reassess. Otherwise, labs appearing stable.      Resulted Orders   PSA, screen   Result Value Ref Range    Prostate Specific Antigen Screen 5.94 0.00 - 6.50 ng/mL    Narrative    This result is obtained using the Roche Elecsys total PSA method on the luna e801 immunoassay analyzer. Results obtained with different assay methods or kits cannot be used interchangeably.   Albumin Random Urine Quantitative with Creat Ratio   Result Value Ref Range    Creatinine Urine mg/dL 104.0 mg/dL      Comment:      The reference ranges have not been established in urine creatinine. The results should be integrated into the clinical context for interpretation.    Albumin Urine mg/L <12.0 mg/L      Comment:      The reference ranges have not been established in urine albumin. The results should be integrated into the clinical context for interpretation.    Albumin Urine mg/g Cr        Comment:      Unable to calculate, urine albumin and/or urine creatinine is outside detectable limits.  Microalbuminuria is defined as an albumin:creatinine ratio of 17 to 299 for males and 25 to 299 for females. A ratio of albumin:creatinine of 300 or higher is indicative of overt proteinuria.  Due to biologic variability, positive results should be confirmed by a second, first-morning random or 24-hour timed urine specimen. If there is discrepancy, a third specimen is recommended. When 2 out of 3 results are in the microalbuminuria range, this is evidence for incipient nephropathy and warrants increased efforts at glucose control, blood pressure control, and institution of therapy with an angiotensin-converting-enzyme (ACE) inhibitor (if the patient can tolerate it).     Basic metabolic panel   Result Value Ref Range     Sodium 139 135 - 145 mmol/L      Comment:      Reference intervals for this test were updated on 09/26/2023 to more accurately reflect our healthy population. There may be differences in the flagging of prior results with similar values performed with this method. Interpretation of those prior results can be made in the context of the updated reference intervals.     Potassium 5.2 3.4 - 5.3 mmol/L    Chloride 101 98 - 107 mmol/L    Carbon Dioxide (CO2) 29 22 - 29 mmol/L    Anion Gap 9 7 - 15 mmol/L    Urea Nitrogen 16.3 8.0 - 23.0 mg/dL    Creatinine 1.27 (H) 0.67 - 1.17 mg/dL    GFR Estimate 59 (L) >60 mL/min/1.73m2    Calcium 9.8 8.8 - 10.2 mg/dL    Glucose 201 (H) 70 - 99 mg/dL   Hemoglobin A1c   Result Value Ref Range    Hemoglobin A1C 8.0 (H) 0.0 - 5.6 %      Comment:      Normal <5.7%   Prediabetes 5.7-6.4%    Diabetes 6.5% or higher     Note: Adopted from ADA consensus guidelines.   Lipid panel reflex to direct LDL Fasting   Result Value Ref Range    Cholesterol 150 <200 mg/dL    Triglycerides 177 (H) <150 mg/dL    Direct Measure HDL 44 >=40 mg/dL    LDL Cholesterol Calculated 71 <=100 mg/dL    Non HDL Cholesterol 106 <130 mg/dL    Patient Fasting > 8hrs? Yes     Narrative    Cholesterol  Desirable:  <200 mg/dL    Triglycerides  Normal:  Less than 150 mg/dL  Borderline High:  150-199 mg/dL  High:  200-499 mg/dL  Very High:  Greater than or equal to 500 mg/dL    Direct Measure HDL  Female:  Greater than or equal to 50 mg/dL   Male:  Greater than or equal to 40 mg/dL    LDL Cholesterol  Desirable:  <100mg/dL  Above Desirable:  100-129 mg/dL   Borderline High:  130-159 mg/dL   High:  160-189 mg/dL   Very High:  >= 190 mg/dL    Non HDL Cholesterol  Desirable:  130 mg/dL  Above Desirable:  130-159 mg/dL  Borderline High:  160-189 mg/dL  High:  190-219 mg/dL  Very High:  Greater than or equal to 220 mg/dL   UA with Microscopic   Result Value Ref Range    Color Urine Yellow Colorless, Straw, Light Yellow, Yellow     Appearance Urine Clear Clear    Glucose Urine 100 (A) Negative mg/dL    Bilirubin Urine Negative Negative    Ketones Urine Negative Negative mg/dL    Specific Gravity Urine 1.015 1.003 - 1.035    Blood Urine Negative Negative    pH Urine 5.5 5.0 - 7.0    Protein Albumin Urine Negative Negative mg/dL    Urobilinogen Urine 2.0 (A) 0.2, 1.0 E.U./dL    Nitrite Urine Negative Negative    Leukocyte Esterase Urine Negative Negative   Hemoglobin   Result Value Ref Range    Hemoglobin 15.0 13.3 - 17.7 g/dL   Urine Microscopic Exam   Result Value Ref Range    Bacteria Urine Few (A) None Seen /HPF    RBC Urine None Seen 0-2 /HPF /HPF    WBC Urine 0-5 0-5 /HPF /HPF    Squamous Epithelials Urine Few (A) None Seen /LPF       If you have any questions or concerns, please call the clinic at the number listed above.       Sincerely,      Jesse Jeffers MD

## 2024-01-03 NOTE — ASSESSMENT & PLAN NOTE
controlled  hypoglycemic medications: metformin ER 1000mg BID  insulin therapy: none  last HbA1c: 7.1%  microvascular complications: none  macrovascular complications: CAD (diffuse, non-atherostenotic disease; [facility] in '15)  ASA/ABHIJEET/statin: lisinopril 20mg daily, atorvastatin 40mg at bedtime, ASA 81mg daily

## 2024-01-03 NOTE — PATIENT INSTRUCTIONS
Patient Education   Personalized Prevention Plan  You are due for the preventive services outlined below.  Your care team is available to assist you in scheduling these services.  If you have already completed any of these items, please share that information with your care team to update in your medical record.  Health Maintenance Due   Topic Date Due     Urine Test  Never done     Zoster (Shingles) Vaccine (1 of 2) Never done     Hemoglobin  09/30/2021     A1C Lab  12/29/2022     Basic Metabolic Panel  09/29/2023     Cholesterol Lab  09/29/2023     Kidney Microalbumin Urine Test  09/29/2023     Annual Wellness Visit  10/12/2023     Diabetic Foot Exam  10/12/2023     Eye Exam  01/31/2024     Hearing Loss: Care Instructions  Overview     Hearing loss is a sudden or slow decrease in how well you hear. It can range from slight to profound. Permanent hearing loss can occur with aging. It also can happen when you are exposed long-term to loud noise. Examples include listening to loud music, riding motorcycles, or being around other loud machines.  Hearing loss can affect your work and home life. It can make you feel lonely or depressed. You may feel that you have lost your independence. But hearing aids and other devices can help you hear better and feel connected to others.  Follow-up care is a key part of your treatment and safety. Be sure to make and go to all appointments, and call your doctor if you are having problems. It's also a good idea to know your test results and keep a list of the medicines you take.  How can you care for yourself at home?  Avoid loud noises whenever possible. This helps keep your hearing from getting worse.  Always wear hearing protection around loud noises.  Wear a hearing aid as directed.  A professional can help you pick a hearing aid that will work best for you.  You can also get hearing aids over the counter for mild to moderate hearing loss.  Have hearing tests as your doctor  "suggests. They can show whether your hearing has changed. Your hearing aid may need to be adjusted.  Use other devices as needed. These may include:  Telephone amplifiers and hearing aids that can connect to a television, stereo, radio, or microphone.  Devices that use lights or vibrations. These alert you to the doorbell, a ringing telephone, or a baby monitor.  Television closed-captioning. This shows the words at the bottom of the screen. Most new TVs can do this.  TTY (text telephone). This lets you type messages back and forth on the telephone instead of talking or listening. These devices are also called TDD. When messages are typed on the keyboard, they are sent over the phone line to a receiving TTY. The message is shown on a monitor.  Use text messaging, social media, and email if it is hard for you to communicate by telephone.  Try to learn a listening technique called speechreading. It is not lipreading. You pay attention to people's gestures, expressions, posture, and tone of voice. These clues can help you understand what a person is saying. Face the person you are talking to, and have them face you. Make sure the lighting is good. You need to see the other person's face clearly.  Think about counseling if you need help to adjust to your hearing loss.  When should you call for help?  Watch closely for changes in your health, and be sure to contact your doctor if:    You think your hearing is getting worse.     You have new symptoms, such as dizziness or nausea.   Where can you learn more?  Go to https://www.[employer].net/patiented  Enter R798 in the search box to learn more about \"Hearing Loss: Care Instructions.\"  Current as of: February 28, 2023               Content Version: 13.8    5544-7459 [employer].   Care instructions adapted under license by your healthcare professional. If you have questions about a medical condition or this instruction, always ask your healthcare professional. " [employer] disclaims any warranty or liability for your use of this information.

## 2024-01-03 NOTE — PROGRESS NOTES
"SUBJECTIVE:   Manny is a 73 year old, presenting for the following: Returns for regular annual follow-up.  Generally is been doing well.  Continues with regular dermatology follow-up due to remote history of melanoma.  Tolerating medications without issues.  Does not monitor blood pressures at home.  No specific complaints concerns  Medicare Visit        1/3/2024     8:05 AM   Additional Questions   Roomed by Fay DAVIS       Are you in the first 12 months of your Medicare coverage?  No    Healthy Habits:     In general, how would you rate your overall health?  Good    Frequency of exercise:  6-7 days/week    Duration of exercise:  30-45 minutes    Do you usually eat at least 4 servings of fruit and vegetables a day, include whole grains    & fiber and avoid regularly eating high fat or \"junk\" foods?  Yes    Taking medications regularly:  Yes    Barriers to taking medications:  None    Medication side effects:  None    Ability to successfully perform activities of daily living:  No assistance needed    Home Safety:  Lack of grab bars in the bathroom    Hearing Impairment:  Difficulty following a conversation in a noisy restaurant or crowded room and difficulty understanding soft or whispered speech    In the past 6 months, have you been bothered by leaking of urine?  No    In general, how would you rate your overall mental or emotional health?  Good    Additional concerns today:  No      Today's PHQ-2 Score:       1/3/2024     8:02 AM   PHQ-2 ( 1999 [employer])   Q1: Little interest or pleasure in doing things 0   Q2: Feeling down, depressed or hopeless 0   PHQ-2 Score 0   Q1: Little interest or pleasure in doing things Not at all   Q2: Feeling down, depressed or hopeless Not at all   PHQ-2 Score 0           Have you ever done Advance Care Planning? (For example, a Health Directive, POLST, or a discussion with a medical provider or your loved ones about your wishes): Yes, advance care planning is on " file.        Right Ear:      1000 Hz RESPONSE- on Level:   20 db  (Conditioning sound)   1000 Hz: RESPONSE- on Level:   20 db    2000 Hz: RESPONSE- on Level:   20 db    4000 Hz: RESPONSE- on Level: tone not heard    Left Ear:      4000 Hz: RESPONSE- on Level: tone not heard   2000 Hz: RESPONSE- on Level:   20 db    1000 Hz: RESPONSE- on Level:   20 db     500 Hz: RESPONSE- on Level:   20 db     Right Ear:    500 Hz: RESPONSE- on Level:   20 db     Hearing Acuity: Pt not interested in referral this year    Hearing Assessment: normal     Fall risk  Fallen 2 or more times in the past year?: No  Any fall with injury in the past year?: No  click delete button to remove this line now  Cognitive Screening normal cognition    Do you have sleep apnea, excessive snoring or daytime drowsiness? : no    Reviewed and updated as needed this visit by clinical staff   Tobacco  Allergies  Meds              Reviewed and updated as needed this visit by Provider                 Social History     Tobacco Use    Smoking status: Never     Passive exposure: Never    Smokeless tobacco: Never   Substance Use Topics    Alcohol use: Yes     Comment: Alcoholic Drinks/day: 1-2 mon             1/3/2024     8:02 AM   Alcohol Use   Prescreen: >3 drinks/day or >7 drinks/week? No       Do you have a current opioid prescription? No  Do you use any other controlled substances or medications that are not prescribed by a provider? None              Current providers sharing in care for this patient include:   Patient Care Team:  Jesse Jeffers MD as PCP - General (Internal Medicine)  Jesse Jeffers MD as Assigned PCP    The following health maintenance items are reviewed in Epic and correct as of today:  Health Maintenance   Topic Date Due    URINALYSIS  Never done    ZOSTER IMMUNIZATION (1 of 2) Never done    RSV VACCINE (Pregnancy & 60+) (1 - 1-dose 60+ series) Never done    HEMOGLOBIN  09/30/2021    A1C  12/29/2022    INFLUENZA VACCINE (1)  09/01/2023    COVID-19 Vaccine (6 - 2023-24 season) 09/01/2023    BMP  09/29/2023    LIPID  09/29/2023    MICROALBUMIN  09/29/2023    MEDICARE ANNUAL WELLNESS VISIT  10/12/2023    DIABETIC FOOT EXAM  10/12/2023    ANNUAL REVIEW OF HM ORDERS  10/12/2023    EYE EXAM  01/31/2024    COLORECTAL CANCER SCREENING  08/20/2024    FALL RISK ASSESSMENT  01/03/2025    DTAP/TDAP/TD IMMUNIZATION (7 - Td or Tdap) 05/17/2026    ADVANCE CARE PLANNING  10/12/2027    PHQ-2 (once per calendar year)  Completed    Pneumococcal Vaccine: 65+ Years  Completed    AORTIC ANEURYSM SCREENING (SYSTEM ASSIGNED)  Completed    IPV IMMUNIZATION  Aged Out    HPV IMMUNIZATION  Aged Out    MENINGITIS IMMUNIZATION  Aged Out    RSV MONOCLONAL ANTIBODY  Aged Out    HEPATITIS C SCREENING  Discontinued     Current Outpatient Medications   Medication Sig Dispense Refill    atorvastatin (LIPITOR) 40 MG tablet TAKE 1 TABLET BY MOUTH EVERY DAY 90 tablet 3    lisinopril (ZESTRIL) 20 MG tablet TAKE 1 TABLET BY MOUTH EVERY DAY 90 tablet 3    metFORMIN (GLUCOPHAGE XR) 500 MG 24 hr tablet TAKE 2 TABLETS BY MOUTH 2 TIMES DAILY 360 tablet 3             Review of Systems   Constitutional:  Negative for chills and fever.   HENT:  Positive for sore throat. Negative for congestion, ear pain and hearing loss.    Eyes:  Negative for pain and visual disturbance.   Respiratory:  Negative for cough and shortness of breath.    Cardiovascular:  Negative for chest pain, palpitations and peripheral edema.   Gastrointestinal:  Negative for abdominal pain, constipation, diarrhea, heartburn, hematochezia and nausea.   Genitourinary:  Negative for dysuria, frequency, genital sores, hematuria, impotence, penile discharge and urgency.   Musculoskeletal:  Negative for arthralgias, joint swelling and myalgias.   Skin:  Negative for rash.   Neurological:  Negative for dizziness, weakness, headaches and paresthesias.   Psychiatric/Behavioral:  Negative for mood changes. The patient is not  "nervous/anxious.          OBJECTIVE:   BP (!) 161/90   Pulse 62   Temp 98.1  F (36.7  C)   Resp 20   Ht 1.791 m (5' 10.5\")   Wt 91.6 kg (202 lb)   SpO2 97%   BMI 28.57 kg/m   Estimated body mass index is 28.57 kg/m  as calculated from the following:    Height as of this encounter: 1.791 m (5' 10.5\").    Weight as of this encounter: 91.6 kg (202 lb).  Physical Exam  GENERAL: healthy, alert and no distress  NECK: no adenopathy, no asymmetry, masses, or scars and thyroid normal to palpation  RESP: lungs clear to auscultation - no rales, rhonchi or wheezes  CV: regular rate and rhythm, normal S1 S2, no S3 or S4, no murmur, click or rub, no peripheral edema and peripheral pulses strong  ABDOMEN: soft, nontender, no hepatosplenomegaly, no masses and bowel sounds normal  MS: no gross musculoskeletal defects noted, no edema    Diagnostic Test Results:  Labs reviewed in Epic    ASSESSMENT / PLAN:     Problem List Items Addressed This Visit          Endocrine    Type 2 diabetes mellitus without complication, without long-term current use of insulin (H)     controlled  hypoglycemic medications: metformin ER 1000mg BID  insulin therapy: none  last HbA1c: 7.1%  microvascular complications: none  macrovascular complications: CAD (diffuse, non-atherostenotic disease; [facility] in '15)  ASA/ABHIJEET/statin: lisinopril 20mg daily, atorvastatin 40mg at bedtime, ASA 81mg daily           Relevant Medications    aspirin 81 MG EC tablet    Other Relevant Orders    Albumin Random Urine Quantitative with Creat Ratio    Basic metabolic panel    Hemoglobin A1c    Lipid panel reflex to direct LDL Fasting       Circulatory    Primary hypertension     controlled (historically)  current antihypertensive regimen: lisinopril 20mg daily  regimen changes: none  intolerance:  future titration/work-up plan:   bp goal <130/80 - consider further regimen optimization            Urinary    Stage 3a chronic kidney disease (H)    Relevant Orders    UA with " Microscopic    Hemoglobin       Other    History of melanoma     h/o melanoma (resection from scalp)  - follows with dermatology         Healthcare maintenance     - continue with PSA screening until age 75  - CRC due in '24 (historically has been normal); arrange for Cologuard  -Adult vaccinations discussed and updated accordingly  - recommending Shingrix vaccination in the future          Other Visit Diagnoses       Screening for prostate cancer    -  Primary    Relevant Orders    PSA, screen    Diabetes mellitus type 2 in obese (H)        Health care maintenance        Relevant Orders    REVIEW OF HEALTH MAINTENANCE PROTOCOL ORDERS (Completed)    Encounter for Medicare annual wellness exam        Screening for colorectal cancer        Relevant Orders    COLOGUARD(EXACT SCIENCES)              COUNSELING:  Reviewed preventive health counseling, as reflected in patient instructions        He reports that he has never smoked. He has never been exposed to tobacco smoke. He has never used smokeless tobacco.      Appropriate preventive services were discussed with this patient, including applicable screening as appropriate for fall prevention, nutrition, physical activity, Tobacco-use cessation, weight loss and cognition.  Checklist reviewing preventive services available has been given to the patient.    Reviewed patients plan of care and provided an AVS. The Basic Care Plan (routine screening as documented in Health Maintenance) for Manny meets the Care Plan requirement. This Care Plan has been established and reviewed with the Patient.          Jesse Jeffers MD  [facility]    Identified Health Risks:  I have reviewed Opioid Use Disorder and Substance Use Disorder risk factors and made any needed referrals.

## 2024-01-08 ENCOUNTER — ALLIED HEALTH/NURSE VISIT (OUTPATIENT)
Dept: FAMILY MEDICINE | Facility: CLINIC | Age: 74
End: 2024-01-08
Payer: COMMERCIAL

## 2024-01-08 ENCOUNTER — TELEPHONE (OUTPATIENT)
Dept: FAMILY MEDICINE | Facility: CLINIC | Age: 74
End: 2024-01-08

## 2024-01-08 VITALS — HEART RATE: 62 BPM | DIASTOLIC BLOOD PRESSURE: 87 MMHG | SYSTOLIC BLOOD PRESSURE: 155 MMHG

## 2024-01-08 DIAGNOSIS — Z01.30 BLOOD PRESSURE CHECK: Primary | ICD-10-CM

## 2024-01-08 PROCEDURE — 99207 PR NO CHARGE NURSE ONLY: CPT

## 2024-01-08 RX ORDER — AMLODIPINE BESYLATE 5 MG/1
5 TABLET ORAL DAILY
Qty: 90 TABLET | Refills: 3 | Status: SHIPPED | OUTPATIENT
Start: 2024-01-08

## 2024-01-08 NOTE — PROGRESS NOTES
I met with Manny Sarabia at the request of MALLORY ECHAVARRIA MD  to recheck his blood pressure.  Blood pressure medications on the med list were reviewed with patient.    Patient has taken all medications as per usual regimen: Yes  Patient reports tolerating them without any issues or concerns: Yes    Vitals:    01/08/24 1038 01/08/24 1043   BP: (!) 159/82 (!) 155/87   Pulse: 61 62       After 5 minutes, the patient's blood pressure remained greater than or equal to 140/90.    Is the patient currently having any chest pain? No  Does the patient currently have a headache? No  Does the patient currently have any vision changes? No  Does the patient currently have any nausea? No  Does the patient currently have any abdominal pain? No    The previous encounter was reviewed.  The patient was discharged and the note will be sent to the provider for final review.

## 2024-01-08 NOTE — TELEPHONE ENCOUNTER
General Call    Contacts         Type Contact Phone/Fax    01/08/2024 05:24 PM CST Phone (Incoming) Manny Sarabia (Self) [phone] (H)          Reason for Call:  Cologuard Screening    What are your questions or concerns:  Patient called stating his insurance is wanting a phone call from Dr. Jeffers or staff to verify what the Cologuard is being ordered for as it determines how it is paid for by [facility]. Current diagnosis under order is for Colon Cancer Screening.     RN attempted to contact [employer] ([phone]) provider line and office is currently closed.    Forwarding to nurse pool to address tomorrow.     Date of last appointment with provider: 1/3/24    Okay to leave a detailed message?: Yes at Home number on file [phone] (home)

## 2024-01-08 NOTE — TELEPHONE ENCOUNTER
Nurse called home phone and got Maxine, wife. No consent to communicate with Maxine. Manny was on the phone and could not talk. Manny did give verbal consent to give wife Maxine the message from Dr. Jeffers. Message was given, and they will  the Amlodipine from the pharmacy.   Nurse offered to make BP check in 4-6 weeks, but wife states they will call back later to schedule as she did not know husbands schedule that far out.     She did have question for Dr. Jeffers- Should he also be taking the Aspirin 81 MG EC, as well las the Amlodipine?  Nurse will route to provider to advise.

## 2024-01-09 ENCOUNTER — TELEPHONE (OUTPATIENT)
Dept: FAMILY MEDICINE | Facility: CLINIC | Age: 74
End: 2024-01-09
Payer: COMMERCIAL

## 2024-01-09 NOTE — TELEPHONE ENCOUNTER
S-(situation): Called Manny and wife, Maxine back.    B-(background): Nurse spoke to wife yesterday, in regards to medication. Dr. Jeffers sent in Amlodipine 5 mg to start in addition to Lisinopril. Wife was wondering if he should still be taking the Aspirin with the Amlodipine. Nurse routed message to clarify to Dr. Jeffers on 1/8.     A-(assessment): When speaking to Manny today, he thought that he was only to take the Amlodipine. Nurse gave message again from Dr. Jeffers that he is to take the 5 mg Amlodipine in additional to the Lisinopril per Dr. Jeffers. Nurse let them know that from OV on 1/3, Dr Jeffers also did want him on the Aspirin 81 mg, but he is hesitant to take that in addition to the other medications.     R-(recommendations): Will route message to Dr. Jeffers to clarify if he wants him taking Aspirin 81 mg.     Patient requested a copy of his AVS and nurse will print out and put at the  for them to . Highlighted medication changes for patient, but he would still like Dr. Jeffers to confirm on the Aspirin 81 mg.

## 2024-01-09 NOTE — TELEPHONE ENCOUNTER
Medication Question or Refill    What medication are you calling about (include dose and sig)?: Aspirin 81 MG EC tablet    Preferred Pharmacy:  [facility]/pharmacy #[id number] - [address] [address]  Phone: [phone] Fax: [phone]      Controlled Substance Agreement on file:   CSA -- Patient Level:    CSA: None found at the patient level.       Who prescribed the medication?: Dr. Jeffers    Do you need a refill? No    Do you have any questions or concerns?  Yes, wondering if he should still be taking his Aspirin.      Okay to leave a detailed message?: Yes at Home number on file [phone] (home)

## 2024-01-09 NOTE — TELEPHONE ENCOUNTER
Spoke to patient and gave Dr. Jeffers's message below. He stated understanding and would like to wait to start the Aspirin. He has no additional questions at this time. Follow up as needed.

## 2024-01-09 NOTE — TELEPHONE ENCOUNTER
RN called to follow up on patient request: (insurance is wanting a phone call from provider or staff):    [employer] ([phone]) provider line  (RN on hold with not response).  Member ID:[id number]       1/3/24 Lab order placed for screening for colorectal cancer.    Diagnosis Screening for colorectal cancer [Z12.11, Z12.12]  (Attached to order).

## 2024-01-17 ENCOUNTER — ALLIED HEALTH/NURSE VISIT (OUTPATIENT)
Dept: FAMILY MEDICINE | Facility: CLINIC | Age: 74
End: 2024-01-17
Payer: COMMERCIAL

## 2024-01-17 VITALS — DIASTOLIC BLOOD PRESSURE: 84 MMHG | HEART RATE: 65 BPM | OXYGEN SATURATION: 99 % | SYSTOLIC BLOOD PRESSURE: 133 MMHG

## 2024-01-17 DIAGNOSIS — Z01.30 BP CHECK: Primary | ICD-10-CM

## 2024-01-17 PROCEDURE — 99207 PR NO CHARGE NURSE ONLY: CPT

## 2024-01-17 NOTE — PROGRESS NOTES
I met with Manny Sraabia at the request of Dr. Jeffers to recheck his blood pressure.  Blood pressure medications on the med list were reviewed with patient.    Patient has taken all medications as per usual regimen: Yes  Patient reports tolerating them without any issues or concerns: Yes    Vitals:    01/17/24 1011 01/17/24 1016   BP: (!) 147/90 133/84   BP Location: Left arm Left arm   Patient Position: Sitting Sitting   Pulse: 67 65   SpO2: 97% 99%       After 5 minutes, the patient's blood pressure was <140/90, the previous encounter was reviewed, recorded blood pressure below 140/90. Patient was discharged and the note will be sent to the provider for final review.

## 2024-01-24 LAB — NONINV COLON CA DNA+OCC BLD SCRN STL QL: NEGATIVE

## 2024-02-12 ENCOUNTER — ALLIED HEALTH/NURSE VISIT (OUTPATIENT)
Dept: FAMILY MEDICINE | Facility: CLINIC | Age: 74
End: 2024-02-12
Payer: COMMERCIAL

## 2024-02-12 VITALS — SYSTOLIC BLOOD PRESSURE: 129 MMHG | HEART RATE: 61 BPM | DIASTOLIC BLOOD PRESSURE: 74 MMHG

## 2024-02-12 DIAGNOSIS — Z01.30 BLOOD PRESSURE CHECK: Primary | ICD-10-CM

## 2024-02-12 PROCEDURE — 99207 PR NO CHARGE NURSE ONLY: CPT

## 2024-02-12 NOTE — PROGRESS NOTES
I met with Manny Sarabia at the request of MALLORY ECHAVARRIA MD  to recheck his blood pressure.  Blood pressure medications on the med list were reviewed with patient.    Patient has taken all medications as per usual regimen: Yes  Patient reports tolerating them without any issues or concerns: Yes    Vitals:    02/12/24 0922   BP: 129/74   Pulse: 61       Blood pressure was taken, previous encounter was reviewed, recorded blood pressure below 140/90.  Patient was discharged and the note will be sent to the provider for final review.

## 2024-07-26 ENCOUNTER — NURSE TRIAGE (OUTPATIENT)
Dept: NURSING | Facility: CLINIC | Age: 74
End: 2024-07-26

## 2024-07-26 ENCOUNTER — TELEPHONE (OUTPATIENT)
Dept: FAMILY MEDICINE | Facility: CLINIC | Age: 74
End: 2024-07-26

## 2024-07-26 ENCOUNTER — OFFICE VISIT (OUTPATIENT)
Dept: FAMILY MEDICINE | Facility: CLINIC | Age: 74
End: 2024-07-26
Attending: INTERNAL MEDICINE
Payer: COMMERCIAL

## 2024-07-26 VITALS
SYSTOLIC BLOOD PRESSURE: 126 MMHG | BODY MASS INDEX: 28 KG/M2 | WEIGHT: 200 LBS | OXYGEN SATURATION: 98 % | DIASTOLIC BLOOD PRESSURE: 73 MMHG | TEMPERATURE: 97.5 F | HEART RATE: 55 BPM | HEIGHT: 71 IN

## 2024-07-26 DIAGNOSIS — Z00.00 HEALTHCARE MAINTENANCE: ICD-10-CM

## 2024-07-26 DIAGNOSIS — I25.10 CORONARY ARTERY DISEASE INVOLVING NATIVE CORONARY ARTERY OF NATIVE HEART WITHOUT ANGINA PECTORIS: ICD-10-CM

## 2024-07-26 DIAGNOSIS — E11.9 TYPE 2 DIABETES MELLITUS WITHOUT COMPLICATION, WITHOUT LONG-TERM CURRENT USE OF INSULIN (H): Primary | ICD-10-CM

## 2024-07-26 DIAGNOSIS — Z12.5 SCREENING FOR PROSTATE CANCER: ICD-10-CM

## 2024-07-26 DIAGNOSIS — I10 PRIMARY HYPERTENSION: ICD-10-CM

## 2024-07-26 DIAGNOSIS — Z85.820 HISTORY OF MELANOMA: ICD-10-CM

## 2024-07-26 LAB
HBA1C MFR BLD: 9.2 % (ref 0–5.6)
PSA SERPL DL<=0.01 NG/ML-MCNC: 6.24 NG/ML (ref 0–6.5)

## 2024-07-26 PROCEDURE — 83036 HEMOGLOBIN GLYCOSYLATED A1C: CPT | Performed by: INTERNAL MEDICINE

## 2024-07-26 PROCEDURE — 36415 COLL VENOUS BLD VENIPUNCTURE: CPT | Performed by: INTERNAL MEDICINE

## 2024-07-26 PROCEDURE — 99214 OFFICE O/P EST MOD 30 MIN: CPT | Performed by: INTERNAL MEDICINE

## 2024-07-26 PROCEDURE — G0103 PSA SCREENING: HCPCS | Performed by: INTERNAL MEDICINE

## 2024-07-26 RX ORDER — LANCETS
EACH MISCELLANEOUS
Qty: 100 EACH | Refills: 3 | Status: SHIPPED | OUTPATIENT
Start: 2024-07-26

## 2024-07-26 RX ORDER — PIOGLITAZONEHYDROCHLORIDE 15 MG/1
15 TABLET ORAL DAILY
Qty: 90 TABLET | Refills: 3 | Status: SHIPPED | OUTPATIENT
Start: 2024-07-26

## 2024-07-26 RX ORDER — GLIMEPIRIDE 2 MG/1
2 TABLET ORAL
Qty: 90 TABLET | Refills: 3 | Status: SHIPPED | OUTPATIENT
Start: 2024-07-26

## 2024-07-26 NOTE — LETTER
July 26, 2024      Manny GLADYS Sarabia  [address] [id number]        Your A1c is now at 9.2%  -As we talked about in clinic, if A1c were still trending upward, I have placed a referral to diabetic educator  -I have also placed a prescription to begin on 2 additional diabetic medications, pioglitazone 15 mg daily, glimepiride 2 mg daily  -I do encourage him to move forward with getting his glucometer and diabetic testing supplies as prescribed  -When meeting with diabetic educator she may discuss alternative longer-term diabetic medications depending on cost and availability        Sincerely,        Jesse Jeffers MD

## 2024-07-26 NOTE — LETTER
July 29, 2024      Manny GLADYS Sarabia  [address] [place] [id number]        Dear ,    We are writing to inform you of your test results.    Labs as we discussed in clinic.  A1c continues to trend upwards over the past year - it's time to take further measures beyond just metformin therapy.  PSA levels are consistent with historical trends.    Resulted Orders   HEMOGLOBIN A1C   Result Value Ref Range    Hemoglobin A1C 9.2 (H) 0.0 - 5.6 %      Comment:      Normal <5.7%   Prediabetes 5.7-6.4%    Diabetes 6.5% or higher     Note: Adopted from ADA consensus guidelines.    Narrative    No need for repeat testing. Pt has hx.   PSA, screen   Result Value Ref Range    Prostate Specific Antigen Screen 6.24 0.00 - 6.50 ng/mL    Narrative    This result is obtained using the Roche Elecsys total PSA method on the luna e801 immunoassay analyzer. Results obtained with different assay methods or kits cannot be used interchangeably.       If you have any questions or concerns, please call the clinic at the number listed above.       Sincerely,      Jesse Jeffers MD

## 2024-07-26 NOTE — TELEPHONE ENCOUNTER
Patient returned call, RN gave message below. Patient also requests letter in the mail with this information. He will  two new medications, and call to schedule with Dorie Hewitt- number of referral provided.     Letter created and placed in outgoing mail.

## 2024-07-26 NOTE — LETTER
July 26, 2024      Manny Sarabia  [address] [place] [id number]        Parish Bryant,      Your A1c is now at 9.2%  -As we talked about in clinic, if A1c were still trending upward, I have placed a referral to diabetic educator  -I have also placed a prescription to begin on 2 additional diabetic medications, pioglitazone 15 mg daily, glimepiride 2 mg daily  -I do encourage you to move forward with getting your glucometer and diabetic testing supplies as prescribed  -When meeting with diabetic educator she may discuss alternative longer-term diabetic medications depending on cost and availability          Sincerely,    Jesse Jeffers MD

## 2024-07-26 NOTE — TELEPHONE ENCOUNTER
Nurse Triage SBAR    Situation: Blood sugar testing question.     Background:   -Patient calling  -It is okay to call back and leave a detailed message at this number:       Assessment: Had appt with pcp this morning and was prescribed blood sugar test kit. He was told by his provider to check his blood sugar once daily, but is unsure of what time to check it.  Advised to check it in the morning, just prior to eating breakfast and taking his diabetic medications. General guidelines about blood sugar ranges given.  Also advised to call back if having any new symptoms.  Call clinic on Monday morning for clarification about when provider would like pt to take meds and to ask about blood sugar range or any other questions. Call back to triage if any new questions over the weekend.  No triage.       Reason for Disposition   [1] Follow-up call to recent contact AND [2] information only call, no triage required    Protocols used: Information Only Call - No Triage-A-

## 2024-07-26 NOTE — ASSESSMENT & PLAN NOTE
controlled (historically)  current antihypertensive regimen: lisinopril 20mg daily, amlodipine 5mg  regimen changes: none  intolerance:  future titration/work-up plan:   bp goal <130/80 - consider further regimen optimization

## 2024-07-26 NOTE — ASSESSMENT & PLAN NOTE
- continue with PSA screening until age 73  - '24: normal cologuard - no further screening required  -Adult vaccinations discussed and updated accordingly  - completed Shingrix

## 2024-07-26 NOTE — TELEPHONE ENCOUNTER
Patient Returning Call    Reason for call:  Patient will like a call back have follow up question from visit this morning    Information relayed to patient:      Patient has additional questions:  yes      Okay to leave a detailed message?: Yes at Cell number on file:    Telephone Information:   Mobile [phone]

## 2024-07-26 NOTE — LETTER
Hi Manny,     Your A1c now at 9.2%  -As we talked about in clinic, if A1c were still trending upward, I have placed a referral to diabetic educator  -I have also placed a prescription to begin on 2 additional diabetic medications, pioglitazone 15 mg daily, glimepiride 2 mg daily  -I do encourage him to move forward with getting his glucometer and diabetic testing supplies as prescribed  -When meeting with diabetic educator she may discuss alternative longer-term diabetic medications depending on cost and availability      Thank you,   Jesse Jeffers MD

## 2024-07-26 NOTE — ASSESSMENT & PLAN NOTE
uncontrolled  hypoglycemic medications: metformin ER 1000mg BID  - begin on pioglitazone 15mg daily, glimepiride 2mg  -Diabetic supply prescription provided  - referral to CDE; dependent on cost/availability, to consider alternative intro of GLP1 or SGLT2i  insulin therapy: none  last HbA1c: 9.2% (trending upwards)  microvascular complications: none  macrovascular complications: CAD (diffuse, non-atherostenotic disease; [facility] in '15)  ASA/ABHIJEET/statin: lisinopril 20mg daily, atorvastatin 40mg at bedtime, ASA 81mg daily

## 2024-07-26 NOTE — TELEPHONE ENCOUNTER
Left message for patient, requested a call back to discuss below from Dr. Jeffers.     ----- Message from Jesse Jeffers sent at 7/26/2024 12:47 PM CDT -----  Please communicate with patient regarding continued upward trend in A1c now at 9.2%  -As we talked about in clinic, if A1c were still trending upward, I have placed a referral to diabetic educator  -I have also placed a prescription to begin on 2 additional diabetic medications, pioglitazone 15 mg daily, glimepiride 2 mg daily  -I do encourage him to move forward with getting his glucometer and diabetic testing supplies as prescribed  -When meeting with diabetic educator she may discuss alternative longer-term diabetic medications depending on cost and availability

## 2024-07-26 NOTE — PROGRESS NOTES
Assessment & Plan   Problem List Items Addressed This Visit          Endocrine    Type 2 diabetes mellitus without complication, without long-term current use of insulin (H) - Primary     uncontrolled  hypoglycemic medications: metformin ER 1000mg BID  - begin on pioglitazone 15mg daily, glimepiride 2mg  -Diabetic supply prescription provided  - referral to CDE; dependent on cost/availability, to consider alternative intro of GLP1 or SGLT2i  insulin therapy: none  last HbA1c: 9.2% (trending upwards)  microvascular complications: none  macrovascular complications: CAD (diffuse, non-atherostenotic disease; [facility] in '15)  ASA/ABHIJEET/statin: lisinopril 20mg daily, atorvastatin 40mg at bedtime, ASA 81mg daily           Relevant Medications    blood glucose monitoring (NO BRAND SPECIFIED) meter device kit    blood glucose (NO BRAND SPECIFIED) test strip    thin (NO BRAND SPECIFIED) lancets    pioglitazone (ACTOS) 15 MG tablet    glimepiride (AMARYL) 2 MG tablet    Other Relevant Orders    HEMOGLOBIN A1C (Completed)    Adult Diabetes Education  Referral       Circulatory    Primary hypertension     controlled (historically)  current antihypertensive regimen: lisinopril 20mg daily, amlodipine 5mg  regimen changes: none  intolerance:  future titration/work-up plan:   bp goal <130/80 - consider further regimen optimization         Coronary artery disease involving native coronary artery of native heart without angina pectoris     diffuse, non-atherostenotic disease; [facility] in '15   - on atorva            Other    History of melanoma     h/o melanoma (resection from scalp)  - follows with dermatology         Healthcare maintenance     - continue with PSA screening until age 73  - '24: normal cologuard - no further screening required  -Adult vaccinations discussed and updated accordingly  - completed Shingrix          Other Visit Diagnoses       Screening for prostate cancer        Relevant Orders    PSA, screen          "         BMI  Estimated body mass index is 28.29 kg/m  as calculated from the following:    Height as of this encounter: 1.791 m (5' 10.5\").    Weight as of this encounter: 90.7 kg (200 lb).   Weight management plan: Discussed healthy diet and exercise guidelines    FUTURE APPOINTMENTS:       - Follow-up visit in 6 months      Subjective   Manny is a 74 year old, presenting for the following health issues: Returns for regular follow-up.  A1c trending up now over the past 1 year; now at 9.2%.  Not checking blood sugars on a regular basis; no current glucometer.  Would be open to meeting with diabetic educator.  Continues with regular dermatology follow-up related to his melanoma.  He completed Cologuard earlier this year.  Diabetes      7/26/2024    10:08 AM   Additional Questions   Roomed by Fay DAVIS     History of Present Illness       Diabetes:   He presents for follow up of diabetes.    He is not checking blood glucose.         He has no concerns regarding his diabetes at this time.   He is not experiencing numbness or burning in feet, excessive thirst, blurry vision, weight changes or redness, sores or blisters on feet. The patient has not had a diabetic eye exam in the last 12 months.          Reason for visit:  DM  Symptom onset:  More than a month    He eats 0-1 servings of fruits and vegetables daily.He consumes 0 sweetened beverage(s) daily.He exercises with enough effort to increase his heart rate 9 or less minutes per day.  He exercises with enough effort to increase his heart rate 3 or less days per week.   He is taking medications regularly.         Review of Systems  Constitutional, HEENT, cardiovascular, pulmonary, gi and gu systems are negative, except as otherwise noted.      Objective    /73   Pulse 55   Temp 97.5  F (36.4  C)   Ht 1.791 m (5' 10.5\")   Wt 90.7 kg (200 lb)   SpO2 98%   BMI 28.29 kg/m    Body mass index is 28.29 kg/m .  Physical Exam   GENERAL: alert and no " distress  NECK: no adenopathy, no asymmetry, masses, or scars  RESP: lungs clear to auscultation - no rales, rhonchi or wheezes  CV: regular rate and rhythm, normal S1 S2, no S3 or S4, no murmur, click or rub, no peripheral edema  ABDOMEN: soft, nontender, no hepatosplenomegaly, no masses and bowel sounds normal  MS: no gross musculoskeletal defects noted, no edema    Results for orders placed or performed in visit on 07/26/24 (from the past 24 hour(s))   HEMOGLOBIN A1C   Result Value Ref Range    Hemoglobin A1C 9.2 (H) 0.0 - 5.6 %    Narrative    No need for repeat testing. Pt has hx.           Signed Electronically by: Jesse Jeffers MD

## 2024-07-29 NOTE — TELEPHONE ENCOUNTER
RN called and spoke with patient. He cannot get in with Dorie Hewitt until end of the month and was hoping to get in sooner- he is on cancellation list. He has questions on testing his blood sugars and the new medications prescribed.     He was started on glimepiride 2 mg tablet: there are two sets of directions: Take before breakfast and take with breakfast. He would like to know when he should be taking this.     He was also started on pioglitazone 15 mg tablet: directions are to take by mouth daily. He would like to know if it is best to take this in the morning with glimepiride or at a different time of day.     He would also like to confirm that he should be testing blood sugar at the beginning of the day before meals and medication. This is all new to him and would like to do it properly.     Routing to provider to advise- since he will not be seeing Dorie Hewitt for a month to answer his questions.

## 2024-07-30 NOTE — TELEPHONE ENCOUNTER
Called and spoke with wife, she states that Manny has left for the day traveling and will be back tomorrow morning. She requested a call back when he is home to go over information below. Will try again tomorrow 7/31 as requested, to be able to speak with Manny and his wife together.

## 2024-07-31 NOTE — TELEPHONE ENCOUNTER
Patient and wife came into clinic to sign consent forms.  RN gave message to them from Dr. Jeffers.    They state patient has been taking Pioglitazone in the PM but would like to switch to AM to take all 3 together.    RN discussed with Dorie Hewitt who recommends that Manny not take his Pioglitazone tonight and resume tomorrow morning.     Pt is scheduled with Dorie on 8/26/24. Encouraged he keep a record or date and time of BS readings. Also to write down any questions he has to discuss at visit.  Asked that they reach out if anything comes up before his visit on 8/26/24.

## 2024-08-21 DIAGNOSIS — E11.9 TYPE 2 DIABETES MELLITUS WITHOUT COMPLICATION, WITHOUT LONG-TERM CURRENT USE OF INSULIN (H): ICD-10-CM

## 2024-08-21 RX ORDER — BLOOD-GLUCOSE METER
EACH MISCELLANEOUS
Qty: 1 KIT | Refills: 0 | Status: SHIPPED | OUTPATIENT
Start: 2024-08-21

## 2024-08-26 ENCOUNTER — OFFICE VISIT (OUTPATIENT)
Dept: EDUCATION SERVICES | Facility: CLINIC | Age: 74
End: 2024-08-26
Attending: INTERNAL MEDICINE
Payer: COMMERCIAL

## 2024-08-26 VITALS — WEIGHT: 199.4 LBS | BODY MASS INDEX: 27.92 KG/M2 | HEIGHT: 71 IN

## 2024-08-26 DIAGNOSIS — E78.5 DYSLIPIDEMIA: ICD-10-CM

## 2024-08-26 DIAGNOSIS — E11.9 TYPE 2 DIABETES MELLITUS WITHOUT COMPLICATION, WITHOUT LONG-TERM CURRENT USE OF INSULIN (H): Primary | ICD-10-CM

## 2024-08-26 DIAGNOSIS — N18.31 STAGE 3A CHRONIC KIDNEY DISEASE (H): ICD-10-CM

## 2024-08-26 PROCEDURE — G0108 DIAB MANAGE TRN  PER INDIV: HCPCS | Performed by: DIETITIAN, REGISTERED

## 2024-08-26 NOTE — LETTER
8/26/2024         RE: Manny Sarabia  [address] [place] [id number]        Dear Colleague,    Thank you for referring your patient, Manny Sarabia, to the [facility]. Please see a copy of my visit note below.    Diabetes Self-Management Education & Support    Presents for: Individual review type 2 diabetes      Type of Service: In Person Visit      ASSESSMENT:  Patient with increasing hemoglobin A1c.  Patient has a has not had diabetes education previously.  Patient is interested in learning dietary and lifestyle interventions to help improve glycemic control.  Patient is on 3 drug regimen metformin, pioglitazone and glimepiride.      Patient's most recent   Lab Results   Component Value Date    A1C 9.2 07/26/2024    A1C 8.0 01/03/2024    A1C 7.1 09/29/2022    A1C 7.1 10/06/2021    A1C 6.9 09/30/2020       Lab Results   Component Value Date    A1C 9.2 07/26/2024     is not meeting goal of <8.0    Diabetes knowledge and skills assessment:   Patient is knowledgeable in diabetes management concepts related to: Taking Medication and Healthy Coping    Continue education with the following diabetes management concepts: Healthy Eating, Being Active, Monitoring, Problem Solving, and Reducing Risks    Based on learning assessment above, most appropriate setting for further diabetes education would be: Individual setting.      PLAN  Continue with diabetes related medications as directed.  Incorporate carbohydrate controlled meal planning  Total Carbohydrates (in grams) = Breakfast  30    Lunch  30    Supper  30    If desired snacks 15            Topics to cover at upcoming visits: Healthy Eating, Monitoring, Taking Medication, and Reducing Risks    Follow-up: 2 months    See Care Plan for co-developed, patient-state behavior change goals.  AVS provided for patient today.    Education Materials Provided:  Goals for Your Diabetes Care, Carbohydrate Counting, and My Plate  "Planner      SUBJECTIVE/OBJECTIVE:  Presents for: Individual review  Accompanied by: Self, Spouse  Diabetes education in the past 24mo: No  Diabetes type: Type 2  Disease course: Worsening  How confident are you filling out medical forms by yourself:: Quite a bit  Transportation concerns: No  Difficulty affording diabetes medication?: No  Difficulty affording diabetes testing supplies?: No  Other concerns:: Glasses  Cultural Influences/Ethnic Background:  Not  or     Diabetes Symptoms & Complications:  Diabetes Related Symptoms: None  Weight trend: Stable  Symptom course: Worsening  Disease course: Worsening  Complications assessed today?: Yes  Autonomic neuropathy: No  CVA: No  Heart disease: Yes  Nephropathy: Yes  Peripheral neuropathy: No  Peripheral Vascular Disease: No  Retinopathy: No    Patient Problem List and Family Medical History reviewed for relevant medical history, current medical status, and diabetes risk factors.    Vitals:  Ht 1.791 m (5' 10.5\")   Wt 90.4 kg (199 lb 6.4 oz)   BMI 28.21 kg/m    Estimated body mass index is 28.21 kg/m  as calculated from the following:    Height as of this encounter: 1.791 m (5' 10.5\").    Weight as of this encounter: 90.4 kg (199 lb 6.4 oz).   Last 3 BP:   BP Readings from Last 3 Encounters:   07/26/24 126/73   02/12/24 129/74   01/17/24 133/84       History   Smoking Status     Never   Smokeless Tobacco     Never       Labs:  Lab Results   Component Value Date    A1C 9.2 07/26/2024     Lab Results   Component Value Date     01/03/2024     10/06/2021     Lab Results   Component Value Date    LDL 71 01/03/2024     Direct Measure HDL   Date Value Ref Range Status   01/03/2024 44 >=40 mg/dL Final   ]  GFR Estimate   Date Value Ref Range Status   01/03/2024 59 (L) >60 mL/min/1.73m2 Final   10/16/2018 >60 >60 mL/min/1.73m2 Final     GFR Estimate If Black   Date Value Ref Range Status   10/16/2018 >60 >60 mL/min/1.73m2 Final     Lab Results " "  Component Value Date    CR 1.27 01/03/2024     No results found for: \"MICROALBUMIN\"    Healthy Eating:  Healthy Eating Assessed Today: Yes  Cultural/Pentecostal diet restrictions?: No  Do you have any food allergies or intolerances?: No  Meal planning/habits: Other  Please elaborate:: was 1-2 soda per week  How many times a week on average do you eat food made away from home (restaurant/take-out)?: 2  Meals include: Breakfast, Lunch, Dinner  Breakfast: had been having a bowl of cereal now an egg a couple times/ week or smaller bowl of cereal and fruit  Lunch: salad 1x/ day tomato, cucumer, 1/4 cottage cheese milk  Dinner: meat, vegetable, coffee milk, toast or potato  Beverages: Water, Coffee, Milk  Has patient met with a dietitian in the past?: No    Being Active:  Being Active Assessed Today: Yes  Exercise:: Yes  Days per week of moderate to strenuous exercise (like a brisk walk):  (walks to town, mowing 3 laws push)  How intense was your typical exercise? : Light (like stretching or slow walking)  Barrier to exercise: None    Monitoring:  Monitoring Assessed Today: Yes  Did patient bring glucose meter to appointment? : Yes  Blood Glucose Meter: Accu-chek  Times checking blood sugar at home (number): 1  Times checking blood sugar at home (per): Day  Blood glucose trend: Decreasing (30 day average 116mg/dL out of 32 readings 89 - 148mg/dL)    Taking Medications:  Diabetes Medication(s)       Biguanides       metFORMIN (GLUCOPHAGE XR) 500 MG 24 hr tablet TAKE 2 TABLETS BY MOUTH 2 TIMES DAILY       Sulfonylureas       glimepiride (AMARYL) 2 MG tablet Take 1 tablet (2 mg) by mouth every morning (before breakfast)       Insulin Sensitizing Agents       pioglitazone (ACTOS) 15 MG tablet Take 1 tablet (15 mg) by mouth daily            Current Treatments: Diet, Oral Medication (taken by mouth)    Problem Solving:                 Reducing Risks:  Has dilated eye exam at least once a year?: No  Sees dentist every 6 months?: " Yes  Feet checked by healthcare provider in the last year?: Yes    Healthy Coping:     Patient Activation Measure Survey Score:       No data to display                  Care Plan and Education Provided:  Care Plan: Diabetes   Updates made by Jacque Hewitt RD since 9/1/2024 12:00 AM        Problem: HbA1C Not In Goal         Goal: Establish Regular Follow-Ups with PCP         Task: Discuss with PCP the recommended timing for patient's next follow up visit(s)    Responsible User: Jacque Hewitt RD        Task: Discuss schedule for PCP visits with patient Completed 9/1/2024   Responsible User: Jacque Hewitt RD        Goal: Get HbA1C Level in Goal         Task: Educate patient on diabetes education self-management topics    Responsible User: Jacque Hewitt RD        Task: Educate patient on benefits of regular glucose monitoring Completed 9/1/2024   Responsible User: Jacque Hewitt RD        Task: Refer patient to appropriate extended care team member, as needed (Medication Therapy Management, Behavioral Health, Physical Therapy, etc.)    Responsible User: Jacque Hewitt RD        Task: Discuss diabetes treatment plan with patient Completed 9/1/2024   Responsible User: Jacque Hewitt RD        Problem: Diabetes Self-Management Education Needed to Optimize Self-Care Behaviors         Goal: Understand diabetes pathophysiology and disease progression         Task: Provide education on diabetes pathophysiology and disease progression specfic to patient's diabetes type Completed 9/1/2024   Responsible User: Jacque Hewitt RD        Goal: Healthy Eating - follow a healthy eating pattern for diabetes         Task: Provide education on portion control and consistency in amount, composition and timing of food intake Completed 9/1/2024   Responsible User: Jacque Hewitt RD        Task: Provide education on managing carbohydrate intake (carbohydrate counting, plate planning method, etc.) Completed 9/1/2024   Responsible  User: Jacque Hewitt RD        Task: Provide education on weight management    Responsible User: Jacque Hewitt RD        Task: Provide education on heart healthy eating    Responsible User: Jacque Hewitt RD        Task: Provide education on eating out    Responsible User: Jacque Hewitt RD        Task: Develop individualized healthy eating plan with patient    Responsible User: Jacque Hewitt RD        Goal: Being Active - get regular physical activity, working up to at least 150 minutes per week         Task: Provide education on relationship of activity to glucose and precautions to take if at risk for low glucose Completed 9/1/2024   Responsible User: Jacque Hewitt RD        Task: Discuss barriers to physical activity with patient    Responsible User: Jacque Hewitt RD        Task: Develop physical activity plan with patient    Responsible User: Jacque Hewitt RD        Task: Explore community resources including walking groups, assistance programs, and home videos    Responsible User: Jacque Hewitt RD        Goal: Monitoring - monitor glucose and ketones as directed    This Visit's Progress: 20%   Note:    Patient to monitor glucose pre and 2 hours postprandial 2 or more meals per week       Task: Provide education on blood glucose monitoring (purpose, proper technique, frequency, glucose targets, interpreting results, when to use glucose control solution, sharps disposal) Completed 9/1/2024   Responsible User: Jacque Hewitt RD        Task: Provide education on continuous glucose monitoring (sensor placement, use of uday or /reader, understanding glucose trends, alerts and alarms, differences between sensor glucose and blood glucose)    Responsible User: Jacque Hewitt RD        Task: Provide education on ketone monitoring (when to monitor, frequency, etc.)    Responsible User: Jacque Hewitt RD        Goal: Taking Medication - patient is consistently taking medications as directed          Task: Provide education on action of prescribed medication, including when to take and possible side effects Completed 9/1/2024   Responsible User: Jacque Hewitt RD        Task: Provide education on insulin and injectable diabetes medications, including administration, storage, site selection and rotation for injection sites    Responsible User: Jacque Hewitt RD        Task: Discuss barriers to medication adherence with patient and provide management technique ideas as appropriate    Responsible User: Jacque Hewitt RD        Task: Provide education on frequency and refill details of medications    Responsible User: Jacque Hewitt RD        Goal: Problem Solving - know how to prevent and manage short-term diabetes complications         Task: Provide education on high blood glucose - causes, signs/symptoms, prevention and treatment Completed 9/1/2024   Responsible User: Jacque Hewitt RD        Task: Provide education on low blood glucose - causes, signs/symptoms, prevention, treatment, carrying a carbohydrate source at all times, and medical identification    Responsible User: Jacque Hewitt RD        Task: Provide education on safe travel with diabetes    Responsible User: Jacque Hewitt RD        Task: Provide education on how to care for diabetes on sick days    Responsible User: Jacque Hewitt RD        Task: Provide education on when to call a health care provider    Responsible User: Jacque Hewitt RD        Goal: Reducing Risks - know how to prevent and treat long-term diabetes complications         Task: Provide education on major complications of diabetes, prevention, early diagnostic measures and treatment of complications    Responsible User: Jacque Hewitt RD        Task: Provide education on recommended care for dental, eye and foot health    Responsible User: Jacque Hewitt RD        Task: Provide education on Hemoglobin A1c - goals and relationship to blood glucose levels Completed  9/1/2024   Responsible User: Jacque Hewitt RD        Task: Provide education on recommendations for heart health - lipid levels and goals, blood pressure and goals, and aspirin therapy, if indicated    Responsible User: Jacque Hewitt RD        Task: Provide education on tobacco cessation    Responsible User: Jacque Hewitt RD        Goal: Healthy Coping - use available resources to cope with the challenges of managing diabetes         Task: Discuss recognizing feelings about having diabetes    Responsible User: Jacque Hewitt RD        Task: Provide education on the benefits of making appropriate lifestyle changes    Responsible User: Jacque Hewitt RD        Task: Provide education on benefits of utilizing support systems    Responsible User: Jacque Hewitt RD        Task: Discuss methods for coping with stress    Responsible User: Jacque Hewitt RD        Task: Provide education on when to seek professional counseling    Responsible User: Jacque Hewitt RD          Time Spent: 60 minutes  Encounter Type: Individual    Any diabetes medication dose changes were made via the CDE Protocol per the patient's referring provider. A copy of this encounter was shared with the provider.

## 2024-08-26 NOTE — PROGRESS NOTES
Diabetes Self-Management Education & Support    Presents for: Individual review type 2 diabetes      Type of Service: In Person Visit      ASSESSMENT:  Patient with increasing hemoglobin A1c.  Patient has a has not had diabetes education previously.  Patient is interested in learning dietary and lifestyle interventions to help improve glycemic control.  Patient is on 3 drug regimen metformin, pioglitazone and glimepiride.      Patient's most recent   Lab Results   Component Value Date    A1C 9.2 07/26/2024    A1C 8.0 01/03/2024    A1C 7.1 09/29/2022    A1C 7.1 10/06/2021    A1C 6.9 09/30/2020       Lab Results   Component Value Date    A1C 9.2 07/26/2024     is not meeting goal of <8.0    Diabetes knowledge and skills assessment:   Patient is knowledgeable in diabetes management concepts related to: Taking Medication and Healthy Coping    Continue education with the following diabetes management concepts: Healthy Eating, Being Active, Monitoring, Problem Solving, and Reducing Risks    Based on learning assessment above, most appropriate setting for further diabetes education would be: Individual setting.      PLAN  Continue with diabetes related medications as directed.  Incorporate carbohydrate controlled meal planning  Total Carbohydrates (in grams) = Breakfast  30    Lunch  30    Supper  30    If desired snacks 15            Topics to cover at upcoming visits: Healthy Eating, Monitoring, Taking Medication, and Reducing Risks    Follow-up: 2 months    See Care Plan for co-developed, patient-state behavior change goals.  AVS provided for patient today.    Education Materials Provided:  Goals for Your Diabetes Care, Carbohydrate Counting, and My Plate Planner      SUBJECTIVE/OBJECTIVE:  Presents for: Individual review  Accompanied by: Self, Spouse  Diabetes education in the past 24mo: No  Diabetes type: Type 2  Disease course: Worsening  How confident are you filling out medical forms by yourself:: Quite a  "bit  Transportation concerns: No  Difficulty affording diabetes medication?: No  Difficulty affording diabetes testing supplies?: No  Other concerns:: Glasses  Cultural Influences/Ethnic Background:  Not  or     Diabetes Symptoms & Complications:  Diabetes Related Symptoms: None  Weight trend: Stable  Symptom course: Worsening  Disease course: Worsening  Complications assessed today?: Yes  Autonomic neuropathy: No  CVA: No  Heart disease: Yes  Nephropathy: Yes  Peripheral neuropathy: No  Peripheral Vascular Disease: No  Retinopathy: No    Patient Problem List and Family Medical History reviewed for relevant medical history, current medical status, and diabetes risk factors.    Vitals:  Ht 1.791 m (5' 10.5\")   Wt 90.4 kg (199 lb 6.4 oz)   BMI 28.21 kg/m    Estimated body mass index is 28.21 kg/m  as calculated from the following:    Height as of this encounter: 1.791 m (5' 10.5\").    Weight as of this encounter: 90.4 kg (199 lb 6.4 oz).   Last 3 BP:   BP Readings from Last 3 Encounters:   07/26/24 126/73   02/12/24 129/74   01/17/24 133/84       History   Smoking Status    Never   Smokeless Tobacco    Never       Labs:  Lab Results   Component Value Date    A1C 9.2 07/26/2024     Lab Results   Component Value Date     01/03/2024     10/06/2021     Lab Results   Component Value Date    LDL 71 01/03/2024     Direct Measure HDL   Date Value Ref Range Status   01/03/2024 44 >=40 mg/dL Final   ]  GFR Estimate   Date Value Ref Range Status   01/03/2024 59 (L) >60 mL/min/1.73m2 Final   10/16/2018 >60 >60 mL/min/1.73m2 Final     GFR Estimate If Black   Date Value Ref Range Status   10/16/2018 >60 >60 mL/min/1.73m2 Final     Lab Results   Component Value Date    CR 1.27 01/03/2024     No results found for: \"MICROALBUMIN\"    Healthy Eating:  Healthy Eating Assessed Today: Yes  Cultural/Catholic diet restrictions?: No  Do you have any food allergies or intolerances?: No  Meal planning/habits: " Other  Please elaborate:: was 1-2 soda per week  How many times a week on average do you eat food made away from home (restaurant/take-out)?: 2  Meals include: Breakfast, Lunch, Dinner  Breakfast: had been having a bowl of cereal now an egg a couple times/ week or smaller bowl of cereal and fruit  Lunch: salad 1x/ day tomato, cucumer, 1/4 cottage cheese milk  Dinner: meat, vegetable, coffee milk, toast or potato  Beverages: Water, Coffee, Milk  Has patient met with a dietitian in the past?: No    Being Active:  Being Active Assessed Today: Yes  Exercise:: Yes  Days per week of moderate to strenuous exercise (like a brisk walk):  (walks to town, mowing 3 laws push)  How intense was your typical exercise? : Light (like stretching or slow walking)  Barrier to exercise: None    Monitoring:  Monitoring Assessed Today: Yes  Did patient bring glucose meter to appointment? : Yes  Blood Glucose Meter: Accu-chek  Times checking blood sugar at home (number): 1  Times checking blood sugar at home (per): Day  Blood glucose trend: Decreasing (30 day average 116mg/dL out of 32 readings 89 - 148mg/dL)    Taking Medications:  Diabetes Medication(s)       Biguanides       metFORMIN (GLUCOPHAGE XR) 500 MG 24 hr tablet TAKE 2 TABLETS BY MOUTH 2 TIMES DAILY       Sulfonylureas       glimepiride (AMARYL) 2 MG tablet Take 1 tablet (2 mg) by mouth every morning (before breakfast)       Insulin Sensitizing Agents       pioglitazone (ACTOS) 15 MG tablet Take 1 tablet (15 mg) by mouth daily            Current Treatments: Diet, Oral Medication (taken by mouth)    Problem Solving:                 Reducing Risks:  Has dilated eye exam at least once a year?: No  Sees dentist every 6 months?: Yes  Feet checked by healthcare provider in the last year?: Yes    Healthy Coping:     Patient Activation Measure Survey Score:       No data to display                  Care Plan and Education Provided:  Care Plan: Diabetes   Updates made by Jacque Hewitt,  SUZE since 9/1/2024 12:00 AM        Problem: HbA1C Not In Goal         Goal: Establish Regular Follow-Ups with PCP         Task: Discuss with PCP the recommended timing for patient's next follow up visit(s)    Responsible User: Jacque Hewitt RD        Task: Discuss schedule for PCP visits with patient Completed 9/1/2024   Responsible User: Jacque Hewitt RD        Goal: Get HbA1C Level in Goal         Task: Educate patient on diabetes education self-management topics    Responsible User: Jacque Hewitt RD        Task: Educate patient on benefits of regular glucose monitoring Completed 9/1/2024   Responsible User: Jacque Hewitt RD        Task: Refer patient to appropriate extended care team member, as needed (Medication Therapy Management, Behavioral Health, Physical Therapy, etc.)    Responsible User: Jacque Hewitt RD        Task: Discuss diabetes treatment plan with patient Completed 9/1/2024   Responsible User: Jacuqe Hewitt RD        Problem: Diabetes Self-Management Education Needed to Optimize Self-Care Behaviors         Goal: Understand diabetes pathophysiology and disease progression         Task: Provide education on diabetes pathophysiology and disease progression specfic to patient's diabetes type Completed 9/1/2024   Responsible User: Jacque Hewitt RD        Goal: Healthy Eating - follow a healthy eating pattern for diabetes         Task: Provide education on portion control and consistency in amount, composition and timing of food intake Completed 9/1/2024   Responsible User: Jacque Hewitt RD        Task: Provide education on managing carbohydrate intake (carbohydrate counting, plate planning method, etc.) Completed 9/1/2024   Responsible User: Jacque Hewitt RD        Task: Provide education on weight management    Responsible User: Jacque Hewitt RD        Task: Provide education on heart healthy eating    Responsible User: Jacque Hewitt RD        Task: Provide education on eating out     Responsible User: Jacque Hewitt RD        Task: Develop individualized healthy eating plan with patient    Responsible User: Jacque Hewitt RD        Goal: Being Active - get regular physical activity, working up to at least 150 minutes per week         Task: Provide education on relationship of activity to glucose and precautions to take if at risk for low glucose Completed 9/1/2024   Responsible User: Jacque Hewitt RD        Task: Discuss barriers to physical activity with patient    Responsible User: Jacque Hewitt RD        Task: Develop physical activity plan with patient    Responsible User: Jacque Hewitt RD        Task: Explore community resources including walking groups, assistance programs, and home videos    Responsible User: Jacque Hewitt RD        Goal: Monitoring - monitor glucose and ketones as directed    This Visit's Progress: 20%   Note:    Patient to monitor glucose pre and 2 hours postprandial 2 or more meals per week       Task: Provide education on blood glucose monitoring (purpose, proper technique, frequency, glucose targets, interpreting results, when to use glucose control solution, sharps disposal) Completed 9/1/2024   Responsible User: Jacque Hewitt RD        Task: Provide education on continuous glucose monitoring (sensor placement, use of uday or /reader, understanding glucose trends, alerts and alarms, differences between sensor glucose and blood glucose)    Responsible User: Jacque Hewitt RD        Task: Provide education on ketone monitoring (when to monitor, frequency, etc.)    Responsible User: Jacque Hewitt RD        Goal: Taking Medication - patient is consistently taking medications as directed         Task: Provide education on action of prescribed medication, including when to take and possible side effects Completed 9/1/2024   Responsible User: Jacque Hewitt RD        Task: Provide education on insulin and injectable diabetes medications, including  administration, storage, site selection and rotation for injection sites    Responsible User: Jacque Hewitt RD        Task: Discuss barriers to medication adherence with patient and provide management technique ideas as appropriate    Responsible User: Jacque Hewitt RD        Task: Provide education on frequency and refill details of medications    Responsible User: Jacque Hewitt RD        Goal: Problem Solving - know how to prevent and manage short-term diabetes complications         Task: Provide education on high blood glucose - causes, signs/symptoms, prevention and treatment Completed 9/1/2024   Responsible User: Jacque Hewitt RD        Task: Provide education on low blood glucose - causes, signs/symptoms, prevention, treatment, carrying a carbohydrate source at all times, and medical identification    Responsible User: Jacque Hewitt RD        Task: Provide education on safe travel with diabetes    Responsible User: Jacque Hewitt RD        Task: Provide education on how to care for diabetes on sick days    Responsible User: Jacque Hewitt RD        Task: Provide education on when to call a health care provider    Responsible User: Jacque Hewitt RD        Goal: Reducing Risks - know how to prevent and treat long-term diabetes complications         Task: Provide education on major complications of diabetes, prevention, early diagnostic measures and treatment of complications    Responsible User: Jacque Hewitt RD        Task: Provide education on recommended care for dental, eye and foot health    Responsible User: Jacque Hewitt RD        Task: Provide education on Hemoglobin A1c - goals and relationship to blood glucose levels Completed 9/1/2024   Responsible User: Jacque Hewitt RD        Task: Provide education on recommendations for heart health - lipid levels and goals, blood pressure and goals, and aspirin therapy, if indicated    Responsible User: Jacque Hewitt RD        Task: Provide  education on tobacco cessation    Responsible User: Jacque Hewitt RD        Goal: Healthy Coping - use available resources to cope with the challenges of managing diabetes         Task: Discuss recognizing feelings about having diabetes    Responsible User: Jacque Hewitt RD        Task: Provide education on the benefits of making appropriate lifestyle changes    Responsible User: Jacque Hewitt RD        Task: Provide education on benefits of utilizing support systems    Responsible User: Jacque Hewitt RD        Task: Discuss methods for coping with stress    Responsible User: Jacque Hewitt RD        Task: Provide education on when to seek professional counseling    Responsible User: Jacque Hewitt RD          Time Spent: 60 minutes  Encounter Type: Individual    Any diabetes medication dose changes were made via the CDE Protocol per the patient's referring provider. A copy of this encounter was shared with the provider.

## 2024-10-06 DIAGNOSIS — I10 PRIMARY HYPERTENSION: ICD-10-CM

## 2024-10-07 ENCOUNTER — TELEPHONE (OUTPATIENT)
Dept: FAMILY MEDICINE | Facility: CLINIC | Age: 74
End: 2024-10-07
Payer: COMMERCIAL

## 2024-10-07 DIAGNOSIS — I10 PRIMARY HYPERTENSION: ICD-10-CM

## 2024-10-07 RX ORDER — AMLODIPINE BESYLATE 5 MG/1
5 TABLET ORAL DAILY
Qty: 90 TABLET | Refills: 3 | OUTPATIENT
Start: 2024-10-07

## 2024-10-07 NOTE — TELEPHONE ENCOUNTER
"    Patient and wife calling.  [facility] dispensed #20 tabs/amLODIPine (NORVASC) 5 MG tablet.  States pharmacy is trying to coordinate medication dispensed.    Patient received message from pharmacy stating \"needs' provider visit\".  Our records do not show this information.    Advised to contact [facility] for clarification.  "

## 2024-10-21 DIAGNOSIS — E66.9 TYPE 2 DIABETES MELLITUS WITH OBESITY (H): ICD-10-CM

## 2024-10-21 DIAGNOSIS — E11.69 TYPE 2 DIABETES MELLITUS WITH OBESITY (H): ICD-10-CM

## 2024-10-21 RX ORDER — ATORVASTATIN CALCIUM 40 MG/1
40 TABLET, FILM COATED ORAL DAILY
Qty: 90 TABLET | Refills: 1 | Status: SHIPPED | OUTPATIENT
Start: 2024-10-21

## 2024-10-29 ENCOUNTER — OFFICE VISIT (OUTPATIENT)
Dept: EDUCATION SERVICES | Facility: CLINIC | Age: 74
End: 2024-10-29
Payer: COMMERCIAL

## 2024-10-29 VITALS — HEIGHT: 71 IN | BODY MASS INDEX: 26.53 KG/M2 | WEIGHT: 189.5 LBS

## 2024-10-29 DIAGNOSIS — E11.9 TYPE 2 DIABETES MELLITUS WITHOUT COMPLICATION, WITHOUT LONG-TERM CURRENT USE OF INSULIN (H): Primary | ICD-10-CM

## 2024-10-29 LAB
EST. AVERAGE GLUCOSE BLD GHB EST-MCNC: 117 MG/DL
HBA1C MFR BLD: 5.7 % (ref 0–5.6)

## 2024-10-29 PROCEDURE — 99207 PR DROP WITH A PROCEDURE: CPT | Performed by: DIETITIAN, REGISTERED

## 2024-10-29 PROCEDURE — 36415 COLL VENOUS BLD VENIPUNCTURE: CPT | Performed by: DIETITIAN, REGISTERED

## 2024-10-29 PROCEDURE — G0108 DIAB MANAGE TRN  PER INDIV: HCPCS | Performed by: DIETITIAN, REGISTERED

## 2024-10-29 PROCEDURE — 83036 HEMOGLOBIN GLYCOSYLATED A1C: CPT | Performed by: DIETITIAN, REGISTERED

## 2024-10-29 NOTE — PROGRESS NOTES
Diabetes Self-Management Education & Support    Presents for: Follow-up type 2 diabetes, CKD stage 3a, dyslipidemia, hypertension    Type of Service: In Person Visit      ASSESSMENT:  8/26/2024  Patient with increasing hemoglobin A1c.  Patient has a has not had diabetes education previously.  Patient is interested in learning dietary and lifestyle interventions to help improve glycemic control.  Patient is on 3 drug regimen metformin, pioglitazone and glimepiride.      10/29/2024 Patient and wife here today for follow-up.  Patient has been reading labels and following carbohydrate controlled meal planning.  BG testing as recommended.  Patient has learned so much about how food intake affects glycemic control.  Weight is down 10 pounds and A1c showing significant improvement!  Will discontinue pioglitazone and glimepiride.  Patient very willing to continue with dietary interventions.  If patient notes average glucose readings increasing greater than 140s we will add back pioglitazone.  Patient does not have any shortness of breath or edema with pioglitazone.    Lab Results   Component Value Date    A1C 5.7 10/29/2024    A1C 9.2 07/26/2024    A1C 8.0 01/03/2024    A1C 7.1 09/29/2022    A1C 7.1 10/06/2021       is meeting goal of <7.0    Diabetes knowledge and skills assessment:   Patient is knowledgeable in diabetes management concepts related to: Healthy Eating, Being Active, Monitoring, Taking Medication, and Healthy Coping    Continue education with the following diabetes management concepts: Healthy Eating, Taking Medication, Problem Solving, and Reducing Risks    Based on learning assessment above, most appropriate setting for further diabetes education would be: Individual setting.      PLAN  Total Carbohydrates (in grams) = Breakfast  30    Lunch  30    Supper  30    If desired snacks 15                                   Discontinue Glimeiride and pioglitazone  If your average increases above 140 add back  "pioglitazone    8 cups of water per day     https://diabetesfoodhub.org/  American Diabetes Association     Blood Glucose testing max 180mg/dL   Test on 1-3 days per week (before and 2 hours after one meal)  Before eating goal 80 - 130mg/dL  2 hours after goal 80 - 150mg/dL     Topics to cover at upcoming visits: Any area as needed for ongoing diabetes self-management education support    Follow-up: 3 months    See Care Plan for co-developed, patient-state behavior change goals.  AVS provided for patient today.    Education Materials Provided:  Goals for Your Diabetes Care, Carbohydrate Counting, My Plate Planner, and heart healthy dietary guidelines, prevention of complications      SUBJECTIVE/OBJECTIVE:  Presents for: Follow-up  Accompanied by: Self, Spouse  Diabetes education in the past 24mo: Yes  Diabetes type: Type 2  Disease course: Improving  How confident are you filling out medical forms by yourself:: Quite a bit  Transportation concerns: No  Difficulty affording diabetes medication?: No  Difficulty affording diabetes testing supplies?: No  Other concerns:: Glasses  Cultural Influences/Ethnic Background:  Not  or     Diabetes Symptoms & Complications:  Diabetes Related Symptoms: None  Weight trend: Decreasing  Symptom course: Improving  Disease course: Improving  Complications assessed today?: Yes  Autonomic neuropathy: No  CVA: No  Heart disease: Yes  Nephropathy: Yes  Peripheral neuropathy: No  Peripheral Vascular Disease: No  Retinopathy: No    Patient Problem List and Family Medical History reviewed for relevant medical history, current medical status, and diabetes risk factors.    Vitals:  Ht 1.791 m (5' 10.5\")   Wt 86 kg (189 lb 8 oz)   BMI 26.81 kg/m    Estimated body mass index is 26.81 kg/m  as calculated from the following:    Height as of this encounter: 1.791 m (5' 10.5\").    Weight as of this encounter: 86 kg (189 lb 8 oz).   Last 3 BP:   BP Readings from Last 3 Encounters: " "  07/26/24 126/73   02/12/24 129/74   01/17/24 133/84       History   Smoking Status    Never   Smokeless Tobacco    Never       Labs:  Lab Results   Component Value Date    A1C 5.7 10/29/2024     Lab Results   Component Value Date     01/03/2024     10/06/2021     Lab Results   Component Value Date    LDL 71 01/03/2024     Direct Measure HDL   Date Value Ref Range Status   01/03/2024 44 >=40 mg/dL Final   ]  GFR Estimate   Date Value Ref Range Status   01/03/2024 59 (L) >60 mL/min/1.73m2 Final   10/16/2018 >60 >60 mL/min/1.73m2 Final     GFR Estimate If Black   Date Value Ref Range Status   10/16/2018 >60 >60 mL/min/1.73m2 Final     Lab Results   Component Value Date    CR 1.27 01/03/2024     No results found for: \"MICROALBUMIN\"    Healthy Eating:  Healthy Eating Assessed Today: Yes  Cultural/Yarsanism diet restrictions?: No  Do you have any food allergies or intolerances?: No  Meal planning/habits: Carb counting, Low salt, Heart healthy, Avoiding sweets  Who cooks/prepares meals for you?: Spouse  Who purchases food in  your home?: Self, Spouse  How many times a week on average do you eat food made away from home (restaurant/take-out)?: 2  Meals include: Breakfast, Lunch, Dinner  Breakfast: had been having a bowl of cereal now an eggs a few times/ week or smaller bowl of cereal, less milk and fruit  Lunch: salad 1x/ day tomato, cucumer, 1/4 cottage cheese or other protein and small amount of milk  Dinner: meat, vegetable, coffee, small 4oz milk, toast or potato (smaller portion  Beverages: Water, Coffee, Milk  Has patient met with a dietitian in the past?: Yes    Being Active:  Being Active Assessed Today: Yes  Exercise:: Yes  Days per week of moderate to strenuous exercise (like a brisk walk): 4  On average, minutes per day of exercise at this level: 50  How intense was your typical exercise? : Light (like stretching or slow walking)  Exercise Minutes per Week: 200  Barrier to exercise: " None    Monitoring:  Monitoring Assessed Today: Yes  Did patient bring glucose meter to appointment? : Yes  Blood Glucose Meter: Accu-chek  Times checking blood sugar at home (number): 5+  Times checking blood sugar at home (per): Week  Blood glucose trend: Decreasing (30 day average 113mg/dL out of 23 readings 87 - 161mg/dL, 90-day average 115 mg/dL oh of 80 readings)      Taking Medications:  Diabetes Medication(s)       Biguanides       metFORMIN (GLUCOPHAGE XR) 500 MG 24 hr tablet TAKE 2 TABLETS BY MOUTH 2 TIMES DAILY       Sulfonylureas       glimepiride (AMARYL) 2 MG tablet Take 1 tablet (2 mg) by mouth every morning (before breakfast)       Insulin Sensitizing Agents       pioglitazone (ACTOS) 15 MG tablet Take 1 tablet (15 mg) by mouth daily            Taking Medication Assessed Today: Yes  Current Treatments: Diet, Oral Medication (taken by mouth)  Problems taking diabetes medications regularly?: No  Diabetes medication side effects?: No    Problem Solving:  Problem Solving Assessed Today: No  Is the patient at risk for hypoglycemia?: Yes  Hypoglycemia Frequency: Never  Medical ID: No  Does patient have glucagon emergency kit?: No  Is the patient at risk for DKA?: No  Does patient have severe weather/disaster plan for diabetes management?: Yes  Does patient have sick day plan for diabetes management?: Yes    Hypoglycemia Symptoms  Hypoglycemia: None    Hypoglycemia Complications  Hypoglycemia Complications: None    Reducing Risks:  Reducing Risks Assessed Today: Yes  Diabetes Risks: Age over 45 years, Hyperlipidemia  CAD Risks: Male sex  Has dilated eye exam at least once a year?: No (Previous optometrist retired is looking for someone new, patient will be getting an eye exam soon)  Sees dentist every 6 months?: Yes  Feet checked by healthcare provider in the last year?: Yes    Healthy Coping:  Healthy Coping Assessed Today: Yes  Emotional response to diabetes: Acceptance, Confidence diabetes can be  controlled  Informal Support system:: Family, Spouse  Stage of change: ACTION (Actively working towards change)  Support resources: Websites, Magazines, In-person Offerings  Patient Activation Measure Survey Score:       No data to display                  Care Plan and Education Provided:  Care Plan: Diabetes   Updates made by Jacque Hewitt RD since 10/29/2024 12:00 AM        Problem: HbA1C Not In Goal         Goal: Get HbA1C Level in Goal         Task: Educate patient on diabetes education self-management topics Completed 10/29/2024   Responsible User: Jacque Hewitt RD        Problem: Diabetes Self-Management Education Needed to Optimize Self-Care Behaviors         Goal: Healthy Eating - follow a healthy eating pattern for diabetes         Task: Provide education on weight management Completed 10/29/2024   Responsible User: Jacque Hewitt RD        Task: Provide education on heart healthy eating Completed 10/29/2024   Responsible User: Jacque Hewitt RD        Task: Provide education on eating out Completed 10/29/2024   Responsible User: Jacque Hewitt RD        Goal: Being Active - get regular physical activity, working up to at least 150 minutes per week         Task: Develop physical activity plan with patient Completed 10/29/2024   Responsible User: Jacque Hweitt RD        Goal: Monitoring - monitor glucose and ketones as directed    This Visit's Progress: 100%   Recent Progress: 20%   Note:    Patient to monitor glucose pre and 2 hours postprandial 2 or more meals per week       Goal: Taking Medication - patient is consistently taking medications as directed         Task: Provide education on frequency and refill details of medications Completed 10/29/2024   Responsible User: Jacque Hewitt RD        Goal: Problem Solving - know how to prevent and manage short-term diabetes complications         Task: Provide education on safe travel with diabetes Completed 10/29/2024   Responsible User: Kash  SUZE Santillan        Task: Provide education on how to care for diabetes on sick days Completed 10/29/2024   Responsible User: Jacque Hewitt RD        Task: Provide education on when to call a health care provider Completed 10/29/2024   Responsible User: Jacque Hewitt RD        Goal: Reducing Risks - know how to prevent and treat long-term diabetes complications         Task: Provide education on recommended care for dental, eye and foot health Completed 10/29/2024   Responsible User: Jacque Hewitt RD        Task: Provide education on recommendations for heart health - lipid levels and goals, blood pressure and goals, and aspirin therapy, if indicated Completed 10/29/2024   Responsible User: Jacque Hewitt RD        Goal: Healthy Coping - use available resources to cope with the challenges of managing diabetes         Task: Discuss recognizing feelings about having diabetes Completed 10/29/2024   Responsible User: Jacque Hewitt RD        Task: Provide education on the benefits of making appropriate lifestyle changes Completed 10/29/2024   Responsible User: Jacque Hewitt RD        Task: Provide education on benefits of utilizing support systems Completed 10/29/2024   Responsible User: Jacque Hewitt RD            Time Spent: 60 minutes  Encounter Type: Individual    Any diabetes medication dose changes were made via the CDE Protocol per the patient's referring provider. A copy of this encounter was shared with the provider.

## 2024-10-29 NOTE — LETTER
10/29/2024         RE: Manny Sarabia  [address] [place] [id number]        Dear Colleague,    Thank you for referring your patient, Manny Sarabia, to the [facility]. Please see a copy of my visit note below.    Diabetes Self-Management Education & Support    Presents for: Follow-up type 2 diabetes, CKD stage 3a, dyslipidemia, hypertension    Type of Service: In Person Visit      ASSESSMENT:  8/26/2024  Patient with increasing hemoglobin A1c.  Patient has a has not had diabetes education previously.  Patient is interested in learning dietary and lifestyle interventions to help improve glycemic control.  Patient is on 3 drug regimen metformin, pioglitazone and glimepiride.      10/29/2024 Patient and wife here today for follow-up.  Patient has been reading labels and following carbohydrate controlled meal planning.  BG testing as recommended.  Patient has learned so much about how food intake affects glycemic control.  Weight is down 10 pounds and A1c showing significant improvement!  Will discontinue pioglitazone and glimepiride.  Patient very willing to continue with dietary interventions.  If patient notes average glucose readings increasing greater than 140s we will add back pioglitazone.  Patient does not have any shortness of breath or edema with pioglitazone.    Lab Results   Component Value Date    A1C 5.7 10/29/2024    A1C 9.2 07/26/2024    A1C 8.0 01/03/2024    A1C 7.1 09/29/2022    A1C 7.1 10/06/2021       is meeting goal of <7.0    Diabetes knowledge and skills assessment:   Patient is knowledgeable in diabetes management concepts related to: Healthy Eating, Being Active, Monitoring, Taking Medication, and Healthy Coping    Continue education with the following diabetes management concepts: Healthy Eating, Taking Medication, Problem Solving, and Reducing Risks    Based on learning assessment above, most appropriate setting for further diabetes education would be:  "Individual setting.      PLAN  Total Carbohydrates (in grams) = Breakfast  30    Lunch  30    Supper  30    If desired snacks 15                                   Discontinue Glimeiride and pioglitazone  If your average increases above 140 add back pioglitazone    8 cups of water per day     https://diabetesfoodhub.org/  American Diabetes Association     Blood Glucose testing max 180mg/dL   Test on 1-3 days per week (before and 2 hours after one meal)  Before eating goal 80 - 130mg/dL  2 hours after goal 80 - 150mg/dL     Topics to cover at upcoming visits: Any area as needed for ongoing diabetes self-management education support    Follow-up: 3 months    See Care Plan for co-developed, patient-state behavior change goals.  AVS provided for patient today.    Education Materials Provided:  Goals for Your Diabetes Care, Carbohydrate Counting, My Plate Planner, and heart healthy dietary guidelines, prevention of complications      SUBJECTIVE/OBJECTIVE:  Presents for: Follow-up  Accompanied by: Self, Spouse  Diabetes education in the past 24mo: Yes  Diabetes type: Type 2  Disease course: Improving  How confident are you filling out medical forms by yourself:: Quite a bit  Transportation concerns: No  Difficulty affording diabetes medication?: No  Difficulty affording diabetes testing supplies?: No  Other concerns:: Glasses  Cultural Influences/Ethnic Background:  Not  or     Diabetes Symptoms & Complications:  Diabetes Related Symptoms: None  Weight trend: Decreasing  Symptom course: Improving  Disease course: Improving  Complications assessed today?: Yes  Autonomic neuropathy: No  CVA: No  Heart disease: Yes  Nephropathy: Yes  Peripheral neuropathy: No  Peripheral Vascular Disease: No  Retinopathy: No    Patient Problem List and Family Medical History reviewed for relevant medical history, current medical status, and diabetes risk factors.    Vitals:  Ht 1.791 m (5' 10.5\")   Wt 86 kg (189 lb 8 oz)   BMI " "26.81 kg/m    Estimated body mass index is 26.81 kg/m  as calculated from the following:    Height as of this encounter: 1.791 m (5' 10.5\").    Weight as of this encounter: 86 kg (189 lb 8 oz).   Last 3 BP:   BP Readings from Last 3 Encounters:   07/26/24 126/73   02/12/24 129/74   01/17/24 133/84       History   Smoking Status     Never   Smokeless Tobacco     Never       Labs:  Lab Results   Component Value Date    A1C 5.7 10/29/2024     Lab Results   Component Value Date     01/03/2024     10/06/2021     Lab Results   Component Value Date    LDL 71 01/03/2024     Direct Measure HDL   Date Value Ref Range Status   01/03/2024 44 >=40 mg/dL Final   ]  GFR Estimate   Date Value Ref Range Status   01/03/2024 59 (L) >60 mL/min/1.73m2 Final   10/16/2018 >60 >60 mL/min/1.73m2 Final     GFR Estimate If Black   Date Value Ref Range Status   10/16/2018 >60 >60 mL/min/1.73m2 Final     Lab Results   Component Value Date    CR 1.27 01/03/2024     No results found for: \"MICROALBUMIN\"    Healthy Eating:  Healthy Eating Assessed Today: Yes  Cultural/Yazdanism diet restrictions?: No  Do you have any food allergies or intolerances?: No  Meal planning/habits: Carb counting, Low salt, Heart healthy, Avoiding sweets  Who cooks/prepares meals for you?: Spouse  Who purchases food in  your home?: Self, Spouse  How many times a week on average do you eat food made away from home (restaurant/take-out)?: 2  Meals include: Breakfast, Lunch, Dinner  Breakfast: had been having a bowl of cereal now an eggs a few times/ week or smaller bowl of cereal, less milk and fruit  Lunch: salad 1x/ day tomato, cucumer, 1/4 cottage cheese or other protein and small amount of milk  Dinner: meat, vegetable, coffee, small 4oz milk, toast or potato (smaller portion  Beverages: Water, Coffee, Milk  Has patient met with a dietitian in the past?: Yes    Being Active:  Being Active Assessed Today: Yes  Exercise:: Yes  Days per week of moderate to " strenuous exercise (like a brisk walk): 4  On average, minutes per day of exercise at this level: 50  How intense was your typical exercise? : Light (like stretching or slow walking)  Exercise Minutes per Week: 200  Barrier to exercise: None    Monitoring:  Monitoring Assessed Today: Yes  Did patient bring glucose meter to appointment? : Yes  Blood Glucose Meter: Accu-chek  Times checking blood sugar at home (number): 5+  Times checking blood sugar at home (per): Week  Blood glucose trend: Decreasing (30 day average 113mg/dL out of 23 readings 87 - 161mg/dL, 90-day average 115 mg/dL oh of 80 readings)      Taking Medications:  Diabetes Medication(s)       Biguanides       metFORMIN (GLUCOPHAGE XR) 500 MG 24 hr tablet TAKE 2 TABLETS BY MOUTH 2 TIMES DAILY       Sulfonylureas       glimepiride (AMARYL) 2 MG tablet Take 1 tablet (2 mg) by mouth every morning (before breakfast)       Insulin Sensitizing Agents       pioglitazone (ACTOS) 15 MG tablet Take 1 tablet (15 mg) by mouth daily            Taking Medication Assessed Today: Yes  Current Treatments: Diet, Oral Medication (taken by mouth)  Problems taking diabetes medications regularly?: No  Diabetes medication side effects?: No    Problem Solving:  Problem Solving Assessed Today: No  Is the patient at risk for hypoglycemia?: Yes  Hypoglycemia Frequency: Never  Medical ID: No  Does patient have glucagon emergency kit?: No  Is the patient at risk for DKA?: No  Does patient have severe weather/disaster plan for diabetes management?: Yes  Does patient have sick day plan for diabetes management?: Yes    Hypoglycemia Symptoms  Hypoglycemia: None    Hypoglycemia Complications  Hypoglycemia Complications: None    Reducing Risks:  Reducing Risks Assessed Today: Yes  Diabetes Risks: Age over 45 years, Hyperlipidemia  CAD Risks: Male sex  Has dilated eye exam at least once a year?: No (Previous optometrist retired is looking for someone new, patient will be getting an eye  exam soon)  Sees dentist every 6 months?: Yes  Feet checked by healthcare provider in the last year?: Yes    Healthy Coping:  Healthy Coping Assessed Today: Yes  Emotional response to diabetes: Acceptance, Confidence diabetes can be controlled  Informal Support system:: Family, Spouse  Stage of change: ACTION (Actively working towards change)  Support resources: Websites, Magazines, In-person Offerings  Patient Activation Measure Survey Score:       No data to display                  Care Plan and Education Provided:  Care Plan: Diabetes   Updates made by Jacque Hewitt RD since 10/29/2024 12:00 AM        Problem: HbA1C Not In Goal         Goal: Get HbA1C Level in Goal         Task: Educate patient on diabetes education self-management topics Completed 10/29/2024   Responsible User: Jacque Hewitt RD        Problem: Diabetes Self-Management Education Needed to Optimize Self-Care Behaviors         Goal: Healthy Eating - follow a healthy eating pattern for diabetes         Task: Provide education on weight management Completed 10/29/2024   Responsible User: Jacque Hewitt RD        Task: Provide education on heart healthy eating Completed 10/29/2024   Responsible User: Jacque Hewitt RD        Task: Provide education on eating out Completed 10/29/2024   Responsible User: Jacque Hewitt RD        Goal: Being Active - get regular physical activity, working up to at least 150 minutes per week         Task: Develop physical activity plan with patient Completed 10/29/2024   Responsible User: Jacque Hewitt RD        Goal: Monitoring - monitor glucose and ketones as directed    This Visit's Progress: 100%   Recent Progress: 20%   Note:    Patient to monitor glucose pre and 2 hours postprandial 2 or more meals per week       Goal: Taking Medication - patient is consistently taking medications as directed         Task: Provide education on frequency and refill details of medications Completed 10/29/2024   Responsible  User: Jacque Hewitt RD        Goal: Problem Solving - know how to prevent and manage short-term diabetes complications         Task: Provide education on safe travel with diabetes Completed 10/29/2024   Responsible User: Jacque Hewitt RD        Task: Provide education on how to care for diabetes on sick days Completed 10/29/2024   Responsible User: Jacque Hewitt RD        Task: Provide education on when to call a health care provider Completed 10/29/2024   Responsible User: Jacque Hewitt RD        Goal: Reducing Risks - know how to prevent and treat long-term diabetes complications         Task: Provide education on recommended care for dental, eye and foot health Completed 10/29/2024   Responsible User: Jacque Hewitt RD        Task: Provide education on recommendations for heart health - lipid levels and goals, blood pressure and goals, and aspirin therapy, if indicated Completed 10/29/2024   Responsible User: Jacque Hewitt RD        Goal: Healthy Coping - use available resources to cope with the challenges of managing diabetes         Task: Discuss recognizing feelings about having diabetes Completed 10/29/2024   Responsible User: Jacque Hewitt RD        Task: Provide education on the benefits of making appropriate lifestyle changes Completed 10/29/2024   Responsible User: Jacque Hewitt RD        Task: Provide education on benefits of utilizing support systems Completed 10/29/2024   Responsible User: Jacque Hewitt RD            Time Spent: 60 minutes  Encounter Type: Individual    Any diabetes medication dose changes were made via the CDE Protocol per the patient's referring provider. A copy of this encounter was shared with the provider.

## 2024-10-29 NOTE — PATIENT INSTRUCTIONS
https://diabetesfoodhub.org/  American Diabetes Association         Goals:  Practice healthy stress management and mindful eating - think are you physically hungry or are you bored, stressed, emotional etc, make of list of things to do besides eat.    Try to get good quality sleep with a goal of 7-8 hours per night.  Stay physically active daily.  Recommend working up to a total of 30 minutes on 5 days/ week.  Recommend a fitness tracker.     Eat in a healthy way- eliminate trans fats, limit saturated fats and added sugars; follow the plate method - picture above.  Keep a food record (MyFitnessPal, Loseit).    A meal is 3 or more food groups; make it colorful for better nutrition.    Total Carbohydrates (in grams) = Breakfast  30    Lunch  30    Supper  30    If desired snacks 15                                 Discontinue Glimeiride and pioglitazone  If your average increases above 140 add back pioglitazone    8 cups of water per day

## 2024-12-21 DIAGNOSIS — I10 ESSENTIAL HYPERTENSION: ICD-10-CM

## 2024-12-23 RX ORDER — LISINOPRIL 20 MG/1
20 TABLET ORAL DAILY
Qty: 30 TABLET | Refills: 0 | Status: SHIPPED | OUTPATIENT
Start: 2024-12-23

## 2025-01-08 ENCOUNTER — DOCUMENTATION ONLY (OUTPATIENT)
Dept: FAMILY MEDICINE | Facility: CLINIC | Age: 75
End: 2025-01-08
Payer: COMMERCIAL

## 2025-01-08 DIAGNOSIS — I10 PRIMARY HYPERTENSION: ICD-10-CM

## 2025-01-08 DIAGNOSIS — E11.9 TYPE 2 DIABETES MELLITUS WITHOUT COMPLICATION, WITHOUT LONG-TERM CURRENT USE OF INSULIN (H): ICD-10-CM

## 2025-01-08 DIAGNOSIS — N18.31 STAGE 3A CHRONIC KIDNEY DISEASE (H): Primary | ICD-10-CM

## 2025-01-08 NOTE — PROGRESS NOTES
Patient has upcoming lab appointment tomorrow 1/9/25 also appt with you tomorrow after lab appt. Please place orders if needed.    Thank You,  Sissy

## 2025-01-09 ENCOUNTER — LAB (OUTPATIENT)
Dept: LAB | Facility: CLINIC | Age: 75
End: 2025-01-09
Attending: INTERNAL MEDICINE
Payer: COMMERCIAL

## 2025-01-09 ENCOUNTER — OFFICE VISIT (OUTPATIENT)
Dept: FAMILY MEDICINE | Facility: CLINIC | Age: 75
End: 2025-01-09
Payer: COMMERCIAL

## 2025-01-09 VITALS
BODY MASS INDEX: 27.16 KG/M2 | TEMPERATURE: 98.7 F | OXYGEN SATURATION: 99 % | WEIGHT: 194 LBS | RESPIRATION RATE: 10 BRPM | SYSTOLIC BLOOD PRESSURE: 138 MMHG | HEART RATE: 62 BPM | HEIGHT: 71 IN | DIASTOLIC BLOOD PRESSURE: 66 MMHG

## 2025-01-09 DIAGNOSIS — E11.9 TYPE 2 DIABETES MELLITUS WITHOUT COMPLICATION, WITHOUT LONG-TERM CURRENT USE OF INSULIN (H): ICD-10-CM

## 2025-01-09 DIAGNOSIS — N18.31 STAGE 3A CHRONIC KIDNEY DISEASE (H): ICD-10-CM

## 2025-01-09 DIAGNOSIS — I10 PRIMARY HYPERTENSION: ICD-10-CM

## 2025-01-09 DIAGNOSIS — E11.69 TYPE 2 DIABETES MELLITUS WITH OBESITY (H): ICD-10-CM

## 2025-01-09 DIAGNOSIS — Z00.00 HEALTHCARE MAINTENANCE: ICD-10-CM

## 2025-01-09 DIAGNOSIS — E66.9 TYPE 2 DIABETES MELLITUS WITH OBESITY (H): ICD-10-CM

## 2025-01-09 DIAGNOSIS — E11.9 TYPE 2 DIABETES MELLITUS WITHOUT COMPLICATION, WITHOUT LONG-TERM CURRENT USE OF INSULIN (H): Primary | ICD-10-CM

## 2025-01-09 DIAGNOSIS — I10 ESSENTIAL HYPERTENSION: ICD-10-CM

## 2025-01-09 LAB
ANION GAP SERPL CALCULATED.3IONS-SCNC: 11 MMOL/L (ref 7–15)
BUN SERPL-MCNC: 18.1 MG/DL (ref 8–23)
CALCIUM SERPL-MCNC: 10.2 MG/DL (ref 8.8–10.4)
CHLORIDE SERPL-SCNC: 103 MMOL/L (ref 98–107)
CHOLEST SERPL-MCNC: 122 MG/DL
CREAT SERPL-MCNC: 1.32 MG/DL (ref 0.67–1.17)
CREAT UR-MCNC: 67.5 MG/DL
EGFRCR SERPLBLD CKD-EPI 2021: 56 ML/MIN/1.73M2
FASTING STATUS PATIENT QL REPORTED: YES
FASTING STATUS PATIENT QL REPORTED: YES
GLUCOSE SERPL-MCNC: 109 MG/DL (ref 70–99)
HCO3 SERPL-SCNC: 28 MMOL/L (ref 22–29)
HDLC SERPL-MCNC: 48 MG/DL
LDLC SERPL CALC-MCNC: 56 MG/DL
MICROALBUMIN UR-MCNC: <12 MG/L
MICROALBUMIN/CREAT UR: NORMAL MG/G{CREAT}
NONHDLC SERPL-MCNC: 74 MG/DL
POTASSIUM SERPL-SCNC: 4.8 MMOL/L (ref 3.4–5.3)
SODIUM SERPL-SCNC: 142 MMOL/L (ref 135–145)
TRIGL SERPL-MCNC: 89 MG/DL

## 2025-01-09 RX ORDER — METFORMIN HYDROCHLORIDE 500 MG/1
1000 TABLET, EXTENDED RELEASE ORAL 2 TIMES DAILY
Qty: 360 TABLET | Refills: 3 | Status: SHIPPED | OUTPATIENT
Start: 2025-01-09

## 2025-01-09 RX ORDER — ATORVASTATIN CALCIUM 40 MG/1
40 TABLET, FILM COATED ORAL DAILY
Qty: 90 TABLET | Refills: 3 | Status: SHIPPED | OUTPATIENT
Start: 2025-01-09

## 2025-01-09 RX ORDER — LISINOPRIL 20 MG/1
20 TABLET ORAL DAILY
Qty: 30 TABLET | Refills: 3 | Status: SHIPPED | OUTPATIENT
Start: 2025-01-09

## 2025-01-09 SDOH — HEALTH STABILITY: PHYSICAL HEALTH: ON AVERAGE, HOW MANY MINUTES DO YOU ENGAGE IN EXERCISE AT THIS LEVEL?: 0 MIN

## 2025-01-09 SDOH — HEALTH STABILITY: PHYSICAL HEALTH: ON AVERAGE, HOW MANY DAYS PER WEEK DO YOU ENGAGE IN MODERATE TO STRENUOUS EXERCISE (LIKE A BRISK WALK)?: 0 DAYS

## 2025-01-09 ASSESSMENT — SOCIAL DETERMINANTS OF HEALTH (SDOH): HOW OFTEN DO YOU GET TOGETHER WITH FRIENDS OR RELATIVES?: THREE TIMES A WEEK

## 2025-01-09 NOTE — ASSESSMENT & PLAN NOTE
controlled  hypoglycemic medications: metformin ER 1000mg BID, pioglitazone 15mg daily  1/2025: stopped glimepiride  insulin therapy: none  last HbA1c: 5.7%  microvascular complications: none  macrovascular complications: CAD (diffuse, non-atherostenotic disease; [facility] in '15)  ASA/ABHIJEET/statin: lisinopril 20mg daily, atorvastatin 40mg at bedtime, ASA 81mg daily

## 2025-01-09 NOTE — ASSESSMENT & PLAN NOTE
- no further PSA screening  - '24: normal cologuard - no further screening required  -Adult vaccinations discussed and updated accordingly  - completed Shingrix

## 2025-01-09 NOTE — PROGRESS NOTES
Preventive Care Visit  [facility]  Jesse Jeffers MD, Internal Medicine  Jan 9, 2025      Assessment & Plan   Problem List Items Addressed This Visit          Endocrine    Type 2 diabetes mellitus without complication, without long-term current use of insulin (H) - Primary     controlled  hypoglycemic medications: metformin ER 1000mg BID, pioglitazone 15mg daily  1/2025: stopped glimepiride  insulin therapy: none  last HbA1c: 5.7%  microvascular complications: none  macrovascular complications: CAD (diffuse, non-atherostenotic disease; [facility] in '15)  ASA/ABHIJEET/statin: lisinopril 20mg daily, atorvastatin 40mg at bedtime, ASA 81mg daily           Relevant Medications    metFORMIN (GLUCOPHAGE XR) 500 MG 24 hr tablet       Circulatory    Primary hypertension     controlled (historically)  current antihypertensive regimen: lisinopril 20mg daily, amlodipine 5mg  regimen changes: none  intolerance:  future titration/work-up plan:   bp goal <130/80 - consider further regimen optimization         Relevant Medications    lisinopril (ZESTRIL) 20 MG tablet       Urinary    Stage 3a chronic kidney disease (H)    Relevant Orders    Hemoglobin    Basic metabolic panel    Lipid panel reflex to direct LDL Fasting    Albumin Random Urine Quantitative with Creat Ratio       Other    Healthcare maintenance     - no further PSA screening  - '24: normal cologuard - no further screening required  -Adult vaccinations discussed and updated accordingly  - completed Shingrix         Relevant Orders    REVIEW OF HEALTH MAINTENANCE PROTOCOL ORDERS (Completed)     Other Visit Diagnoses       Type 2 diabetes mellitus with obesity (H)        Relevant Medications    atorvastatin (LIPITOR) 40 MG tablet    metFORMIN (GLUCOPHAGE XR) 500 MG 24 hr tablet    Essential hypertension        Relevant Medications    lisinopril (ZESTRIL) 20 MG tablet                Counseling  Appropriate preventive services were addressed with this  patient via screening, questionnaire, or discussion as appropriate for fall prevention, nutrition, physical activity, Tobacco-use cessation, social engagement, weight loss and cognition.  Checklist reviewing preventive services available has been given to the patient.  Reviewed patient's diet, addressing concerns and/or questions.     FUTURE APPOINTMENTS:       - Follow-up visit in 6 months      Subjective   Manny is a 74 year old, presenting for the following: Returns for annual wellness evaluation and general follow-up.  His work with diabetic educator since our last visit.  Has seen a notable improvement in glycemic control, checking blood sugars on most days.  Denies current hypoglycemic symptoms.  Staying active with walking ice fishing.  Feels well.  Describes some occasional intentional tremor mainly with right hand.  Medicare Visit        1/9/2025    10:46 AM   Additional Questions   Roomed by Fay EISENBERG    Health Care Directive  Patient does not have a Health Care Directive: Discussed advance care planning with patient; information given to patient to review.      1/9/2025   General Health   How would you rate your overall physical health? Excellent   Feel stress (tense, anxious, or unable to sleep) Not at all         1/9/2025   Nutrition   Diet: Low salt    Carbohydrate counting       Multiple values from one day are sorted in reverse-chronological order         1/9/2025   Exercise   Days per week of moderate/strenous exercise 0 days   Average minutes spent exercising at this level 0 min   (!) EXERCISE CONCERN      1/9/2025   Social Factors   Frequency of gathering with friends or relatives Three times a week   Worry food won't last until get money to buy more No   Food not last or not have enough money for food? No   Do you have housing? (Housing is defined as stable permanent housing and does not include staying ouside in a car, in a tent, in an abandoned building, in an overnight shelter, or  couch-surfing.) Yes   Are you worried about losing your housing? No   Lack of transportation? No   Unable to get utilities (heat,electricity)? No         1/9/2025   Fall Risk   Fallen 2 or more times in the past year? No    Trouble with walking or balance? No        Proxy-reported          1/9/2025   Activities of Daily Living- Home Safety   Needs help with the following daily activites None of the above   Safety concerns in the home None of the above         1/9/2025   Dental   Dentist two times every year? Yes         1/9/2025   Hearing Screening   Hearing concerns? None of the above         1/9/2025   Driving Risk Screening   Patient/family members have concerns about driving No         1/9/2025   General Alertness/Fatigue Screening   Have you been more tired than usual lately? No         1/9/2025   Urinary Incontinence Screening   Bothered by leaking urine in past 6 months No         1/9/2025   TB Screening   Were you born outside of the [place]? No         Today's PHQ-2 Score:       1/9/2025    10:53 AM   PHQ-2 ( 1999 [employer])   Q1: Little interest or pleasure in doing things 0   Q2: Feeling down, depressed or hopeless 0   PHQ-2 Score 0           1/9/2025   Substance Use   Alcohol more than 3/day or more than 7/wk No   Do you have a current opioid prescription? No   How severe/bad is pain from 1 to 10? 0/10 (No Pain)   Do you use any other substances recreationally? No     Social History     Tobacco Use    Smoking status: Never     Passive exposure: Never    Smokeless tobacco: Never   Vaping Use    Vaping status: Never Used   Substance Use Topics    Alcohol use: Yes     Comment: Alcoholic Drinks/day: 1-2 mon    Drug use: No       ASCVD Risk   The 10-year ASCVD risk score (Jimmy FRANCIS, et al., 2019) is: 55.6%    Values used to calculate the score:      Age: 74 years      Sex: Male      Is Non- : No      Diabetic: Yes      Tobacco smoker: No      Systolic Blood Pressure: 144 mmHg      Is  "BP treated: Yes      HDL Cholesterol: 44 mg/dL      Total Cholesterol: 150 mg/dL          Reviewed and updated as needed this visit by Provider                    Current providers sharing in care for this patient include:  Patient Care Team:  Jesse Jeffers MD as PCP - General (Internal Medicine)  Jesse Jeffers MD as Assigned PCP  Jacque Hewitt RD as Diabetes Educator (Dietitian, Registered)    The following health maintenance items are reviewed in Epic and correct as of today:  Health Maintenance   Topic Date Due    EYE EXAM  01/31/2024    BMP  01/03/2025    LIPID  01/03/2025    MICROALBUMIN  01/03/2025    ANNUAL REVIEW OF HM ORDERS  01/03/2025    MEDICARE ANNUAL WELLNESS VISIT  01/03/2025    HEMOGLOBIN  01/03/2025    A1C  01/29/2025    DIABETIC FOOT EXAM  07/26/2025    FALL RISK ASSESSMENT  01/09/2026    DTAP/TDAP/TD IMMUNIZATION (7 - Td or Tdap) 05/17/2026    ADVANCE CARE PLANNING  01/03/2029    PHQ-2 (once per calendar year)  Completed    INFLUENZA VACCINE  Completed    Pneumococcal Vaccine: 50+ Years  Completed    URINALYSIS  Completed    ZOSTER IMMUNIZATION  Completed    RSV VACCINE  Completed    COVID-19 Vaccine  Completed    HPV IMMUNIZATION  Aged Out    MENINGITIS IMMUNIZATION  Aged Out    RSV MONOCLONAL ANTIBODY  Aged Out    HEPATITIS C SCREENING  Discontinued    COLORECTAL CANCER SCREENING  Discontinued         Review of Systems  Constitutional, HEENT, cardiovascular, pulmonary, gi and gu systems are negative, except as otherwise noted.     Objective    Exam  BP (!) 144/82   Pulse 62   Temp 98.7  F (37.1  C)   Resp 10   Ht 1.791 m (5' 10.5\")   Wt 88 kg (194 lb)   SpO2 99%   BMI 27.44 kg/m     Estimated body mass index is 27.44 kg/m  as calculated from the following:    Height as of this encounter: 1.791 m (5' 10.5\").    Weight as of this encounter: 88 kg (194 lb).    Physical Exam  GENERAL: alert and no distress  NECK: no adenopathy, no asymmetry, masses, or scars  RESP: lungs clear to " auscultation - no rales, rhonchi or wheezes  CV: regular rate and rhythm, normal S1 S2, no S3 or S4, no murmur, click or rub, no peripheral edema  ABDOMEN: soft, nontender, no hepatosplenomegaly, no masses and bowel sounds normal  MS: no gross musculoskeletal defects noted, no edema         1/9/2025   Mini Cog   Clock Draw Score 2 Normal   3 Item Recall 2 objects recalled   Mini Cog Total Score 4              Signed Electronically by: Jesse Jeffers MD

## 2025-01-29 ENCOUNTER — OFFICE VISIT (OUTPATIENT)
Dept: EDUCATION SERVICES | Facility: CLINIC | Age: 75
End: 2025-01-29
Payer: COMMERCIAL

## 2025-01-29 VITALS — WEIGHT: 190 LBS | BODY MASS INDEX: 26.6 KG/M2 | HEIGHT: 71 IN

## 2025-01-29 DIAGNOSIS — E78.5 DYSLIPIDEMIA: ICD-10-CM

## 2025-01-29 DIAGNOSIS — E11.9 TYPE 2 DIABETES MELLITUS WITHOUT COMPLICATION, WITHOUT LONG-TERM CURRENT USE OF INSULIN (H): Primary | ICD-10-CM

## 2025-01-29 DIAGNOSIS — N18.31 STAGE 3A CHRONIC KIDNEY DISEASE (H): ICD-10-CM

## 2025-01-29 PROCEDURE — 99207 PR DROP WITH A PROCEDURE: CPT | Performed by: DIETITIAN, REGISTERED

## 2025-01-29 PROCEDURE — G0108 DIAB MANAGE TRN  PER INDIV: HCPCS | Performed by: DIETITIAN, REGISTERED

## 2025-01-29 NOTE — PROGRESS NOTES
Diabetes Self-Management Education & Support    Presents for: Follow-up type 2 diabetes, CKD stage IIIa, dyslipidemia, hypertension    Type of Service: In Person Visit      Assessment  8/26/2024  Patient with increasing hemoglobin A1c.  Patient has a has not had diabetes education previously.  Patient is interested in learning dietary and lifestyle interventions to help improve glycemic control.  Patient is on 3 drug regimen metformin, pioglitazone and glimepiride.       10/29/2024 Patient and wife here today for follow-up.  Patient has been reading labels and following carbohydrate controlled meal planning.  BG testing as recommended.  Patient has learned so much about how food intake affects glycemic control.  Weight is down 10 pounds and A1c showing significant improvement!  Will discontinue pioglitazone and glimepiride.  Patient very willing to continue with dietary interventions.  If patient notes average glucose readings increasing greater than 140s we will add back pioglitazone.  Patient does not have any shortness of breath or edema with pioglitazone.    1/29/2024 Patient and wife here today for follow-up.  Patient has continued to follow carbohydrate controlled meal planning.  Does admit over the holidays and special occasions did have a few sweets.  Patient understands how intake is affecting glycemic control.  Weight is stable.  A1c in target range.  Current medications include metformin and pioglitazone.  30-day glucose average 133 mg/dL.  No changes at this time.  Patient will follow-up in 6 months.    Patient's most recent   Lab Results   Component Value Date    A1C 5.7 10/29/2024    A1C 9.2 07/26/2024    A1C 8.0 01/03/2024    A1C 7.1 09/29/2022    A1C 7.1 10/06/2021       is meeting goal of <7.0    Diabetes knowledge and skills assessment:   Patient is knowledgeable in diabetes management concepts related to: Healthy Eating, Being Active, Monitoring, Taking Medication, Problem Solving, Reducing Risks,  and Healthy Coping    Based on learning assessment above, most appropriate setting for further diabetes education would be: Individual setting.    Care Plan and Education Provided:  Healthy Eating: Balanced meals, Eating out, and Heart healthy diet,     Being Active: Amount recommended (150 minutes moderate activity and 2-3 days strength training per week),     Monitoring: Frequency of monitoring and Individual glucose targets,     Taking Medication: Action of prescribed medication(s) and Side effects of prescribed medication(s),     Problem Solving: Sick day arrangements,     Reducing Risks: Complications of diabetes, Dental care, Eye care, Foot care, Goal for A1c, how it relates to glucose and how often to check, and Preventing cardiovascular disease, including blood pressure goals, lipid goals, recommendations for cardioprotective medications, statins, and aspirin, and     Healthy Coping: Identifying helpful resources    Patient verbalized understanding of diabetes self-management education concepts discussed, opportunities for ongoing education and support, and recommendations provided today.    Plan    Continue Glucophage  mg 2 tablets twice daily and pioglitazone 15 mg daily    8 cups of water per day      https://diabetesfoodhub.org/  American Diabetes Association      Blood Glucose testing max 180mg/dL   Test on 1-3 days per week (before and 2 hours after one meal)  Before eating goal 80 - 130mg/dL  2 hours after goal 80 - 150mg/dL     Topics to cover at upcoming visits: Any area as needed for ongoing diabetes self-management education and support    Follow-up: 6 months  Upcoming Diabetes Ed Appointments     Visit Type Date Time Department    DIABETES NUTRITION 1/29/2025  9:00 AM [facility] DIABETES EDUCATION        See Care Plan for co-developed, patient-state behavior change goals.    Education Materials Provided:        Subjective/Objective  Manny is an 74 year old year old, presenting for the following  "diabetes education related to: Presents for: Follow-up  Accompanied by: Self, Spouse  Diabetes education in the past 24mo: Yes  Diabetes type: Type 2  Disease course: Stable  How confident are you filling out medical forms by yourself:: Quite a bit  Transportation concerns: No  Difficulty affording diabetes medication?: No  Difficulty affording diabetes testing supplies?: No  Other concerns:: Glasses  Cultural Influences/Ethnic Background:  Not  or     Diabetes Symptoms & Complications:  Diabetes Related Symptoms: None  Weight trend: Stable  Symptom course: Stable  Disease course: Stable  Complications assessed today?: Yes  Autonomic neuropathy: No  CVA: No  Heart disease: Yes  Nephropathy: Yes  Peripheral neuropathy: No  Peripheral Vascular Disease: No  Retinopathy: No    Patient Problem List and Family Medical History reviewed for relevant medical history, current medical status, and diabetes risk factors.    Vitals:  Ht 1.791 m (5' 10.5\")   Wt 86.2 kg (190 lb)   BMI 26.88 kg/m    Estimated body mass index is 26.88 kg/m  as calculated from the following:    Height as of this encounter: 1.791 m (5' 10.5\").    Weight as of this encounter: 86.2 kg (190 lb).   Last 3 BP:   BP Readings from Last 3 Encounters:   01/09/25 138/66   07/26/24 126/73   02/12/24 129/74       History   Smoking Status    Never   Smokeless Tobacco    Never       Labs:  Lab Results   Component Value Date    A1C 5.7 10/29/2024     Lab Results   Component Value Date     01/09/2025     10/06/2021     Lab Results   Component Value Date    LDL 56 01/09/2025     Direct Measure HDL   Date Value Ref Range Status   01/09/2025 48 >=40 mg/dL Final   ]  GFR Estimate   Date Value Ref Range Status   01/09/2025 56 (L) >60 mL/min/1.73m2 Final     Comment:     eGFR calculated using 2021 CKD-EPI equation.   10/16/2018 >60 >60 mL/min/1.73m2 Final     GFR Estimate If Black   Date Value Ref Range Status   10/16/2018 >60 >60 mL/min/1.73m2 " "Final     Lab Results   Component Value Date    CR 1.32 01/09/2025     No results found for: \"MICROALBUMIN\"    Healthy Eating:  Healthy Eating Assessed Today: Yes  Cultural/Restorationist diet restrictions?: No  Do you have any food allergies or intolerances?: No  Meal planning/habits: Carb counting, Low salt, Heart healthy, Avoiding sweets  Who cooks/prepares meals for you?: Spouse  Who purchases food in  your home?: Self, Spouse  How many times a week on average do you eat food made away from home (restaurant/take-out)?: 2  Meals include: Breakfast, Lunch, Dinner  Breakfast: had been having eggs a few times per week but now has been more oatmeal over the winter old fashioned or multigrain/ week or smaller bowl of cereal, less milk and fruit  Lunch: salad 1x/ day tomato, cucumer, 1/4 cottage cheese or other protein and small amount of milk every other day will have homemade soup with a variety of vegetables  Dinner: meat, vegetable, coffee, small 4oz milk, toast or potato (smaller portion  Other: a few more treats over the holidays  Beverages: Water, Coffee, Milk  Has patient met with a dietitian in the past?: Yes    Being Active:  Being Active Assessed Today: Yes  Exercise:: Yes  Days per week of moderate to strenuous exercise (like a brisk walk): 4  On average, minutes per day of exercise at this level: 50  How intense was your typical exercise? : Light (like stretching or slow walking)  Exercise Minutes per Week: 200  Barrier to exercise: None    Monitoring:  Monitoring Assessed Today: Yes  Did patient bring glucose meter to appointment? : Yes  Blood Glucose Meter: Accu-chek  Times checking blood sugar at home (number): 5+  Times checking blood sugar at home (per): Week  Blood glucose trend: No change (higher average than last review 30 day average 133mg/dL out of 13 readings, 90-day average 136 mg/dL oh of 59 readings)    Taking Medications:  Diabetes Medication(s)       Biguanides       metFORMIN (GLUCOPHAGE XR) " 500 MG 24 hr tablet Take 2 tablets (1,000 mg) by mouth 2 times daily.       Insulin Sensitizing Agents       pioglitazone (ACTOS) 15 MG tablet Take 1 tablet (15 mg) by mouth daily          Taking Medication Assessed Today: Yes  Current Treatments: Diet, Oral Medication (taken by mouth)  Problems taking diabetes medications regularly?: No  Diabetes medication side effects?: No    Problem Solving:  Problem Solving Assessed Today: No  Is the patient at risk for hypoglycemia?: No  Hypoglycemia Frequency: Never  Hypoglycemia Treatment: Candcristina  Medical ID: No  Does patient have glucagon emergency kit?: No  Is the patient at risk for DKA?: No  Does patient have severe weather/disaster plan for diabetes management?: Yes  Does patient have sick day plan for diabetes management?: Yes  Hypoglycemia: None  Hypoglycemia Complications: None  Reducing Risks:  Reducing Risks Assessed Today: Yes  Diabetes Risks: Age over 45 years, Hyperlipidemia, Sedentary Lifestyle  CAD Risks: Male sex, Diabetes Mellitus, Dyslipidemia, Hypertension, Sedentary lifestyle  Has dilated eye exam at least once a year?: No (Previous optometrist retired is looking for someone new, patient will be getting an eye exam soon)  Sees dentist every 6 months?: Yes  Feet checked by healthcare provider in the last year?: Yes    Healthy Coping:  Healthy Coping Assessed Today: Yes  Emotional response to diabetes: Acceptance, Confidence diabetes can be controlled  Informal Support system:: Family, Spouse  Stage of change: ACTION (Actively working towards change)  Support resources: Websites, Magazines, In-person Offerings    Jacque Hewitt RD  Time Spent: 60 minutes  Encounter Type: Individual    Any diabetes medication dose changes were made via the CDCES Standing Orders under the patient's referring provider.

## 2025-01-29 NOTE — PATIENT INSTRUCTIONS
Goals:  Practice healthy stress management and mindful eating - think are you physically hungry or are you bored, stressed, emotional etc, make of list of things to do besides eat.    Try to get good quality sleep with a goal of 7-8 hours per night.  Stay physically active daily.  Recommend working up to a total of 30 minutes on 5 days/ week.  Recommend a fitness tracker.     Eat in a healthy way- eliminate trans fats, limit saturated fats and added sugars; follow the plate method - picture above.  Keep a food record (MyFitnessPal, Loseit).    A meal is 3 or more food groups; make it colorful for better nutrition.    Total Carbohydrates (in grams) = Breakfast  30    Lunch  30    Supper  30    If desired snacks 15                .

## 2025-01-29 NOTE — LETTER
1/29/2025         RE: Manny Sarabia  [address] [place] [id number]        Dear Colleague,    Thank you for referring your patient, Manny Sarabia, to the [facility]. Please see a copy of my visit note below.    Diabetes Self-Management Education & Support    Presents for: Follow-up type 2 diabetes, CKD stage IIIa, dyslipidemia, hypertension    Type of Service: In Person Visit      Assessment  8/26/2024  Patient with increasing hemoglobin A1c.  Patient has a has not had diabetes education previously.  Patient is interested in learning dietary and lifestyle interventions to help improve glycemic control.  Patient is on 3 drug regimen metformin, pioglitazone and glimepiride.       10/29/2024 Patient and wife here today for follow-up.  Patient has been reading labels and following carbohydrate controlled meal planning.  BG testing as recommended.  Patient has learned so much about how food intake affects glycemic control.  Weight is down 10 pounds and A1c showing significant improvement!  Will discontinue pioglitazone and glimepiride.  Patient very willing to continue with dietary interventions.  If patient notes average glucose readings increasing greater than 140s we will add back pioglitazone.  Patient does not have any shortness of breath or edema with pioglitazone.    1/29/2024 Patient and wife here today for follow-up.  Patient has continued to follow carbohydrate controlled meal planning.  Does admit over the holidays and special occasions did have a few sweets.  Patient understands how intake is affecting glycemic control.  Weight is stable.  A1c in target range.  Current medications include metformin and pioglitazone.  30-day glucose average 133 mg/dL.  No changes at this time.  Patient will follow-up in 6 months.    Patient's most recent   Lab Results   Component Value Date    A1C 5.7 10/29/2024    A1C 9.2 07/26/2024    A1C 8.0 01/03/2024    A1C 7.1 09/29/2022    A1C  7.1 10/06/2021       is meeting goal of <7.0    Diabetes knowledge and skills assessment:   Patient is knowledgeable in diabetes management concepts related to: Healthy Eating, Being Active, Monitoring, Taking Medication, Problem Solving, Reducing Risks, and Healthy Coping    Based on learning assessment above, most appropriate setting for further diabetes education would be: Individual setting.    Care Plan and Education Provided:  Healthy Eating: Balanced meals, Eating out, and Heart healthy diet,     Being Active: Amount recommended (150 minutes moderate activity and 2-3 days strength training per week),     Monitoring: Frequency of monitoring and Individual glucose targets,     Taking Medication: Action of prescribed medication(s) and Side effects of prescribed medication(s),     Problem Solving: Sick day arrangements,     Reducing Risks: Complications of diabetes, Dental care, Eye care, Foot care, Goal for A1c, how it relates to glucose and how often to check, and Preventing cardiovascular disease, including blood pressure goals, lipid goals, recommendations for cardioprotective medications, statins, and aspirin, and     Healthy Coping: Identifying helpful resources    Patient verbalized understanding of diabetes self-management education concepts discussed, opportunities for ongoing education and support, and recommendations provided today.    Plan    Continue Glucophage  mg 2 tablets twice daily and pioglitazone 15 mg daily    8 cups of water per day      https://diabetesfoodhub.org/  American Diabetes Association      Blood Glucose testing max 180mg/dL   Test on 1-3 days per week (before and 2 hours after one meal)  Before eating goal 80 - 130mg/dL  2 hours after goal 80 - 150mg/dL     Topics to cover at upcoming visits: Any area as needed for ongoing diabetes self-management education and support    Follow-up: 6 months  Upcoming Diabetes Ed Appointments     Visit Type Date Time Department    DIABETES  "NUTRITION 1/29/2025  9:00 AM [facility] DIABETES EDUCATION        See Care Plan for co-developed, patient-state behavior change goals.    Education Materials Provided:        Subjective/Objective  Manny is an 74 year old year old, presenting for the following diabetes education related to: Presents for: Follow-up  Accompanied by: Self, Spouse  Diabetes education in the past 24mo: Yes  Diabetes type: Type 2  Disease course: Stable  How confident are you filling out medical forms by yourself:: Quite a bit  Transportation concerns: No  Difficulty affording diabetes medication?: No  Difficulty affording diabetes testing supplies?: No  Other concerns:: Glasses  Cultural Influences/Ethnic Background:  Not  or     Diabetes Symptoms & Complications:  Diabetes Related Symptoms: None  Weight trend: Stable  Symptom course: Stable  Disease course: Stable  Complications assessed today?: Yes  Autonomic neuropathy: No  CVA: No  Heart disease: Yes  Nephropathy: Yes  Peripheral neuropathy: No  Peripheral Vascular Disease: No  Retinopathy: No    Patient Problem List and Family Medical History reviewed for relevant medical history, current medical status, and diabetes risk factors.    Vitals:  Ht 1.791 m (5' 10.5\")   Wt 86.2 kg (190 lb)   BMI 26.88 kg/m    Estimated body mass index is 26.88 kg/m  as calculated from the following:    Height as of this encounter: 1.791 m (5' 10.5\").    Weight as of this encounter: 86.2 kg (190 lb).   Last 3 BP:   BP Readings from Last 3 Encounters:   01/09/25 138/66   07/26/24 126/73   02/12/24 129/74       History   Smoking Status     Never   Smokeless Tobacco     Never       Labs:  Lab Results   Component Value Date    A1C 5.7 10/29/2024     Lab Results   Component Value Date     01/09/2025     10/06/2021     Lab Results   Component Value Date    LDL 56 01/09/2025     Direct Measure HDL   Date Value Ref Range Status   01/09/2025 48 >=40 mg/dL Final   ]  GFR Estimate   Date Value " "Ref Range Status   01/09/2025 56 (L) >60 mL/min/1.73m2 Final     Comment:     eGFR calculated using 2021 CKD-EPI equation.   10/16/2018 >60 >60 mL/min/1.73m2 Final     GFR Estimate If Black   Date Value Ref Range Status   10/16/2018 >60 >60 mL/min/1.73m2 Final     Lab Results   Component Value Date    CR 1.32 01/09/2025     No results found for: \"MICROALBUMIN\"    Healthy Eating:  Healthy Eating Assessed Today: Yes  Cultural/Jewish diet restrictions?: No  Do you have any food allergies or intolerances?: No  Meal planning/habits: Carb counting, Low salt, Heart healthy, Avoiding sweets  Who cooks/prepares meals for you?: Spouse  Who purchases food in  your home?: Self, Spouse  How many times a week on average do you eat food made away from home (restaurant/take-out)?: 2  Meals include: Breakfast, Lunch, Dinner  Breakfast: had been having eggs a few times per week but now has been more oatmeal over the winter old fashioned or multigrain/ week or smaller bowl of cereal, less milk and fruit  Lunch: salad 1x/ day tomato, cucumer, 1/4 cottage cheese or other protein and small amount of milk every other day will have homemade soup with a variety of vegetables  Dinner: meat, vegetable, coffee, small 4oz milk, toast or potato (smaller portion  Other: a few more treats over the holidays  Beverages: Water, Coffee, Milk  Has patient met with a dietitian in the past?: Yes    Being Active:  Being Active Assessed Today: Yes  Exercise:: Yes  Days per week of moderate to strenuous exercise (like a brisk walk): 4  On average, minutes per day of exercise at this level: 50  How intense was your typical exercise? : Light (like stretching or slow walking)  Exercise Minutes per Week: 200  Barrier to exercise: None    Monitoring:  Monitoring Assessed Today: Yes  Did patient bring glucose meter to appointment? : Yes  Blood Glucose Meter: Accu-chek  Times checking blood sugar at home (number): 5+  Times checking blood sugar at home (per): " Week  Blood glucose trend: No change (higher average than last review 30 day average 133mg/dL out of 13 readings, 90-day average 136 mg/dL oh of 59 readings)    Taking Medications:  Diabetes Medication(s)       Biguanides       metFORMIN (GLUCOPHAGE XR) 500 MG 24 hr tablet Take 2 tablets (1,000 mg) by mouth 2 times daily.       Insulin Sensitizing Agents       pioglitazone (ACTOS) 15 MG tablet Take 1 tablet (15 mg) by mouth daily          Taking Medication Assessed Today: Yes  Current Treatments: Diet, Oral Medication (taken by mouth)  Problems taking diabetes medications regularly?: No  Diabetes medication side effects?: No    Problem Solving:  Problem Solving Assessed Today: No  Is the patient at risk for hypoglycemia?: No  Hypoglycemia Frequency: Never  Hypoglycemia Treatment: [place]  Medical ID: No  Does patient have glucagon emergency kit?: No  Is the patient at risk for DKA?: No  Does patient have severe weather/disaster plan for diabetes management?: Yes  Does patient have sick day plan for diabetes management?: Yes  Hypoglycemia: None  Hypoglycemia Complications: None  Reducing Risks:  Reducing Risks Assessed Today: Yes  Diabetes Risks: Age over 45 years, Hyperlipidemia, Sedentary Lifestyle  CAD Risks: Male sex, Diabetes Mellitus, Dyslipidemia, Hypertension, Sedentary lifestyle  Has dilated eye exam at least once a year?: No (Previous optometrist retired is looking for someone new, patient will be getting an eye exam soon)  Sees dentist every 6 months?: Yes  Feet checked by healthcare provider in the last year?: Yes    Healthy Coping:  Healthy Coping Assessed Today: Yes  Emotional response to diabetes: Acceptance, Confidence diabetes can be controlled  Informal Support system:: Family, Spouse  Stage of change: ACTION (Actively working towards change)  Support resources: Websites, Magazines, In-person Offerings    Jacque Hewitt RD  Time Spent: 60 minutes  Encounter Type: Individual    Any diabetes medication  dose changes were made via the [facility] Standing Orders under the patient's referring provider.

## 2025-03-26 ENCOUNTER — ALLIED HEALTH/NURSE VISIT (OUTPATIENT)
Dept: FAMILY MEDICINE | Facility: CLINIC | Age: 75
End: 2025-03-26
Payer: COMMERCIAL

## 2025-03-26 DIAGNOSIS — Z23 ENCOUNTER FOR IMMUNIZATION: Primary | ICD-10-CM

## 2025-03-26 PROCEDURE — 99207 PR NO CHARGE NURSE ONLY: CPT

## 2025-03-26 PROCEDURE — 91320 SARSCV2 VAC 30MCG TRS-SUC IM: CPT

## 2025-03-26 PROCEDURE — 90480 ADMN SARSCOV2 VAC 1/ONLY CMP: CPT

## 2025-03-26 NOTE — PROGRESS NOTES
Prior to immunization administration, verified patients identity using patient s name and date of birth. Please see Immunization Activity for additional information.     Is the patient's temperature normal (100.5 or less)? Yes     Patient MEETS CRITERIA. PROCEED with vaccine administration.      Patient instructed to remain in clinic for 15 minutes afterwards, and to report any adverse reactions.      Link to Ancillary Visit Immunization Standing Orders SmartSet     Screening performed by Theresa Epstein MA on 3/26/2025 at 9:28 AM.

## 2025-04-19 DIAGNOSIS — I10 ESSENTIAL HYPERTENSION: ICD-10-CM

## 2025-04-21 RX ORDER — LISINOPRIL 20 MG/1
20 TABLET ORAL DAILY
Qty: 90 TABLET | Refills: 2 | Status: SHIPPED | OUTPATIENT
Start: 2025-04-21

## 2025-06-02 ENCOUNTER — TRANSFERRED RECORDS (OUTPATIENT)
Dept: HEALTH INFORMATION MANAGEMENT | Facility: CLINIC | Age: 75
End: 2025-06-02
Payer: COMMERCIAL

## 2025-06-02 LAB — RETINOPATHY: NEGATIVE

## 2025-06-09 ENCOUNTER — PATIENT OUTREACH (OUTPATIENT)
Dept: CARE COORDINATION | Facility: CLINIC | Age: 75
End: 2025-06-09
Payer: COMMERCIAL

## 2025-07-09 ENCOUNTER — OFFICE VISIT (OUTPATIENT)
Dept: EDUCATION SERVICES | Facility: CLINIC | Age: 75
End: 2025-07-09
Payer: COMMERCIAL

## 2025-07-09 VITALS — WEIGHT: 189 LBS | HEIGHT: 71 IN | BODY MASS INDEX: 26.46 KG/M2

## 2025-07-09 DIAGNOSIS — E78.5 DYSLIPIDEMIA: ICD-10-CM

## 2025-07-09 DIAGNOSIS — E11.9 TYPE 2 DIABETES MELLITUS WITHOUT COMPLICATION, WITHOUT LONG-TERM CURRENT USE OF INSULIN (H): Primary | ICD-10-CM

## 2025-07-09 DIAGNOSIS — N18.31 STAGE 3A CHRONIC KIDNEY DISEASE (H): ICD-10-CM

## 2025-07-09 NOTE — PATIENT INSTRUCTIONS
"Blood Glucose testing   (before and 2 hours after one meal)  Before eating goal 80 - 130mg/dL  2 hours after goal 80 - 150mg/dL     Beans, fish, \"real meats\" less processed the better      Goals:  Practice healthy stress management and mindful eating - think are you physically hungry or are you bored, stressed, emotional etc, make of list of things to do besides eat.    Try to get good quality sleep with a goal of 7-8 hours per night.  Stay physically active daily.  Recommend working up to a total of 30 minutes on 5 days/ week.  Recommend a fitness tracker.     Eat in a healthy way- eliminate trans fats, limit saturated fats and added sugars; follow the plate method - picture above.  Keep a food record (MyFitnessPal, Loseit).    A meal is 3 or more food groups; make it colorful for better nutrition.    Total Carbohydrates (in grams) = Breakfast  30    Lunch  30    Supper  30    If desired snacks 15                  "

## 2025-07-09 NOTE — LETTER
7/9/2025         RE: Manny Sarabia  [address]  [facility] [address]        Dear Colleague,    Thank you for referring your patient, Manny Sarabia, to the [facility]. Please see a copy of my visit note below.    Diabetes Self-Management Education & Support    Presents for: Follow-up type 2 diabetes, CKD stage IIIa, dyslipidemia, hypertension     Type of Service: In Person Visit      Assessment  8/26/2024  Patient with increasing hemoglobin A1c.  Patient has a has not had diabetes education previously.  Patient is interested in learning dietary and lifestyle interventions to help improve glycemic control.  Patient is on 3 drug regimen metformin, pioglitazone and glimepiride.       10/29/2024 Patient and wife here today for follow-up.  Patient has been reading labels and following carbohydrate controlled meal planning.  BG testing as recommended.  Patient has learned so much about how food intake affects glycemic control.  Weight is down 10 pounds and A1c showing significant improvement!  Will discontinue pioglitazone and glimepiride.  Patient very willing to continue with dietary interventions.  If patient notes average glucose readings increasing greater than 140s we will add back pioglitazone.  Patient does not have any shortness of breath or edema with pioglitazone.     1/29/2025 Patient and wife here today for follow-up.  Patient has continued to follow carbohydrate controlled meal planning.  Does admit over the holidays and special occasions did have a few sweets.  Patient understands how intake is affecting glycemic control.  Weight is stable.  A1c in target range.  Current medications include metformin and pioglitazone.  30-day glucose average 133 mg/dL.  No changes at this time.  Patient will follow-up in 6 months.    7/9/2025  Pt states overall feeling very good.  No specific questions/ concerns.  Weight continues to be stable.  A1c due and will have this with provider.   Anticipated A1c less than 7%    Patient's most recent   Lab Results   Component Value Date    A1C 5.7 10/29/2024    A1C 9.2 07/26/2024    A1C 8.0 01/03/2024    A1C 7.1 09/29/2022    A1C 7.1 10/06/2021       is meeting goal of <7.0    Diabetes knowledge and skills assessment:   Patient is knowledgeable in diabetes management concepts related to: Healthy Eating, Being Active, Monitoring, Taking Medication, Problem Solving, Reducing Risks, and Healthy Coping    Based on learning assessment above, most appropriate setting for further diabetes education would be: Individual setting.    Care Plan and Education Provided:  Healthy Eating: Balanced meals, Portion control, and Weight Management,     Being Active: Amount recommended (150 minutes moderate or 75 minutes vigorous activity and 2-3 days strength training per week) and Relationship of activity to glucose,     Monitoring: Frequency of monitoring and Individual glucose targets,     Taking Medication: Administering and storing injectable diabetes medications and Side effects of prescribed medication(s),     Problem Solving: When to call a health care provider,     Reducing Risks: Goal for A1c, how it relates to glucose and how often to check and Preventing cardiovascular disease, including blood pressure goals, lipid goals, recommendations for cardioprotective medications, statins, and aspirin, and     Healthy Coping: Identifying helpful resources    Patient verbalized understanding of diabetes self-management education concepts discussed, opportunities for ongoing education and support, and recommendations provided today.    Plan  mg daily     8 cups of water per day      https://diabetesfoodhub.org/  American Diabetes Association      Blood Glucose testing max 180mg/dL   Test on 1-3 days per week (before and 2 hours after one meal)  Before eating goal 80 - 130mg/dL  2 hours after goal 80 - 150mg/dL      Topics to cover at upcoming visits: Any area as needed for  "ongoing diabetes self-management education and support     Follow-up: 6 months  See Care Plan for co-developed, patient-state behavior change goals.    Education Materials Provided:  No new materials provided today      Subjective/Objective  Manny is an 75 year old, presenting for the following diabetes education related to: Follow-up  Accompanied by: Self, Spouse  Diabetes education in the past 24mo: (Patient-Rptd) Yes  Diabetes type: (Patient-Rptd) Type 2  Disease course: (Patient-Rptd) Stable  How confident are you filling out medical forms by yourself:: (Patient-Rptd) Somewhat  Diabetes management related comments/concerns: (Patient-Rptd) no  Transportation concerns: No  Difficulty affording diabetes medication?: No  Difficulty affording diabetes testing supplies?: No  Other concerns: Glasses  Cultural Influences/Ethnic Background:  Not  or     Diabetes Symptoms & Complications:  Diabetes Related Symptoms: (Patient-Rptd) None  Weight trend: (Patient-Rptd) Stable  Symptom course: (Patient-Rptd) Stable  Disease course: (Patient-Rptd) Stable  Complications assessed today?: Yes  Autonomic neuropathy: No  CVA: No  Heart disease: Yes  Nephropathy: Yes  Peripheral neuropathy: No  Peripheral Vascular Disease: No  Retinopathy: No (Eye exam 6/4/2025)    Patient Problem List and Family Medical History reviewed for relevant medical history, current medical status, and diabetes risk factors.    Vitals:  Ht 1.791 m (5' 10.5\")   Wt 85.7 kg (189 lb)   BMI 26.74 kg/m    Estimated body mass index is 26.74 kg/m  as calculated from the following:    Height as of this encounter: 1.791 m (5' 10.5\").    Weight as of this encounter: 85.7 kg (189 lb).   Last 3 BP:   BP Readings from Last 3 Encounters:   01/09/25 138/66   07/26/24 126/73   02/12/24 129/74       History   Smoking Status     Never   Smokeless Tobacco     Never       Labs:  Lab Results   Component Value Date    A1C 5.7 10/29/2024     Lab Results   Component " Value Date     01/09/2025     10/06/2021     Lab Results   Component Value Date    LDL 56 01/09/2025     Direct Measure HDL   Date Value Ref Range Status   01/09/2025 48 >=40 mg/dL Final     GFR Estimate   Date Value Ref Range Status   01/09/2025 56 (L) >60 mL/min/1.73m2 Final     Comment:     eGFR calculated using 2021 CKD-EPI equation.   10/16/2018 >60 >60 mL/min/1.73m2 Final     GFR Estimate If Black   Date Value Ref Range Status   10/16/2018 >60 >60 mL/min/1.73m2 Final     Lab Results   Component Value Date    CR 1.32 01/09/2025     Lab Results   Component Value Date    MICROL <12.0 01/09/2025    UMALCR  01/09/2025      Comment:      Unable to calculate, urine albumin and/or urine creatinine is outside detectable limits.  Microalbuminuria is defined as an albumin:creatinine ratio of 17 to 299 for males and 25 to 299 for females. A ratio of albumin:creatinine of 300 or higher is indicative of overt proteinuria.  Due to biologic variability, positive results should be confirmed by a second, first-morning random or 24-hour timed urine specimen. If there is discrepancy, a third specimen is recommended. When 2 out of 3 results are in the microalbuminuria range, this is evidence for incipient nephropathy and warrants increased efforts at glucose control, blood pressure control, and institution of therapy with an angiotensin-converting-enzyme (ACE) inhibitor (if the patient can tolerate it).      [facility] 67.5 01/09/2025 7/9/2025   Healthy Eating   Healthy Eating Assessed Today Yes   Cultural/Amish diet restrictions? No   Do you have any food allergies or intolerances? No   Meal planning/habits Carb counting;Low salt;Heart healthy;Avoiding sweets   Please elaborate: was 1-2 soda per week   Who cooks/prepares meals for you? Spouse   Who purchases food in  your home? Self;Spouse   How many times a week on average do you eat food made away from home (restaurant/take-out)? 1   Meals include  Breakfast;Lunch;Dinner;Morning Snack;Afternoon Snack   Breakfast eggs a couple times per week or smaller bowl of cereal (looks for lower carb), less milk and fruit   Lunch salad 1x/ day tomato, cucumer, 1/4 cottage cheese or other protein and small amount of milk every other day will have homemade soup with a variety of vegetables   Dinner meat, vegetable, coffee, small 4oz milk, toast or potato (smaller portion)   Other a little ice cream now and then   Beverages Water;Coffee;Milk;Diet soda   Has patient met with a dietitian in the past? Yes         7/9/2025   Being Active   Being Active Assessed Today Yes   Exercise: Yes   Days per week of moderate to strenuous exercise (like a brisk walk) 3   On average, minutes per day of exercise at this level 30   How intense was your typical exercise?  Light (like stretching or slow walking)   Exercise Minutes per Week 90   Barrier to exercise None         7/9/2025   Monitoring   Monitoring Assessed Today Yes   Did patient bring glucose meter to appointment?  Yes   Blood Glucose Meter Accu-chek   Times checking blood sugar at home (number) 1   Times checking blood sugar at home (per) Week   Blood glucose trend No change       30 day average 140mg/dL out of 6 readings, 90-day average 142 mg/dL 16 readings (range 115 - 160mg/dL      Diabetes Medication(s)       Biguanides       metFORMIN (GLUCOPHAGE XR) 500 MG 24 hr tablet Take 2 tablets (1,000 mg) by mouth 2 times daily.       Insulin Sensitizing Agents       pioglitazone (ACTOS) 15 MG tablet Take 1 tablet (15 mg) by mouth daily              7/9/2025   Taking Medications   Taking Medication Assessed Today Yes   Current Treatments Diet;Oral Medication (taken by mouth)   Problems taking diabetes medications regularly? No   Diabetes medication side effects? No         7/9/2025   Problem Solving   Problem Solving Assessed Today No   Is the patient at risk for hypoglycemia? No   Hypoglycemia Frequency Never   Hypoglycemia Treatment  [place]   Medical ID No   Does patient have glucagon emergency kit? No   Is the patient at risk for DKA? No   Does patient have severe weather/disaster plan for diabetes management? Yes   Does patient have sick day plan for diabetes management? Yes   Hypoglycemia None   Hypoglycemia Complications None           7/9/2025   Reducing Risks   Reducing Risks Assessed Today Yes   Diabetes Risks Age over 45 years;Hyperlipidemia;Sedentary Lifestyle   CAD Risks Male sex;Diabetes Mellitus;Dyslipidemia;Hypertension;Sedentary lifestyle   Has dilated eye exam at least once a year? Yes   Sees dentist every 6 months? Yes   Feet checked by healthcare provider in the last year? Yes         7/9/2025   Healthy Coping: Diabetes Distress Assessment   Healthy Coping Assessed Today Yes   I feel burned out by all of the attention and effort that diabetes demands of me. 2 - A Little Problem   It bothers me that diabetes seems to control my life. 2 - A Little Problem   I am frustrated that even when I do what I am supposed to for my diabetes, it doesn't seem to make a difference. 2 - A Little Problem   No matter how hard I try with my diabetes, it feels like it will never be good enough. 2 - A Little Problem   I am so tired of having to worry about diabetes all the time. 2 - A Little Problem   When it comes to my diabetes, I often feel like a failure. 2 - A Little Problem   It depresses me when I realize that my diabetes will likely never go away. 2 - A Little Problem   Living with diabetes is overwhelming for me. 2 - A Little Problem   T2 DDAS Total Score (0 - 1.9 Little or no DD, 2.0 - 2.9 Moderate DD,  3.0+ High DD) 2    Informal Support system: Ana based;Family;Friends;Spouse;Other       Patient-reported     Jacque Hewitt RD, JASSI, [facility]     Time Spent: 60 minutes  Encounter Type: Individual    Any diabetes medication dose changes were made via the [facility] Standing Orders under the patient's referring provider.

## 2025-07-15 DIAGNOSIS — E11.9 TYPE 2 DIABETES MELLITUS WITHOUT COMPLICATION, WITHOUT LONG-TERM CURRENT USE OF INSULIN (H): ICD-10-CM

## 2025-07-15 RX ORDER — PIOGLITAZONE 15 MG/1
15 TABLET ORAL DAILY
Qty: 90 TABLET | Refills: 0 | Status: SHIPPED | OUTPATIENT
Start: 2025-07-15

## 2025-08-12 ENCOUNTER — OFFICE VISIT (OUTPATIENT)
Dept: FAMILY MEDICINE | Facility: CLINIC | Age: 75
End: 2025-08-12
Payer: COMMERCIAL

## 2025-08-12 ENCOUNTER — TELEPHONE (OUTPATIENT)
Dept: FAMILY MEDICINE | Facility: CLINIC | Age: 75
End: 2025-08-12

## 2025-08-12 ENCOUNTER — RESULTS FOLLOW-UP (OUTPATIENT)
Dept: FAMILY MEDICINE | Facility: CLINIC | Age: 75
End: 2025-08-12

## 2025-08-12 VITALS
RESPIRATION RATE: 20 BRPM | OXYGEN SATURATION: 99 % | BODY MASS INDEX: 26.18 KG/M2 | TEMPERATURE: 97.3 F | HEART RATE: 60 BPM | WEIGHT: 187 LBS | SYSTOLIC BLOOD PRESSURE: 133 MMHG | HEIGHT: 71 IN | DIASTOLIC BLOOD PRESSURE: 78 MMHG

## 2025-08-12 DIAGNOSIS — E11.9 TYPE 2 DIABETES MELLITUS WITHOUT COMPLICATION, WITHOUT LONG-TERM CURRENT USE OF INSULIN (H): ICD-10-CM

## 2025-08-12 DIAGNOSIS — I10 PRIMARY HYPERTENSION: ICD-10-CM

## 2025-08-12 DIAGNOSIS — E11.69 TYPE 2 DIABETES MELLITUS WITH OBESITY (H): ICD-10-CM

## 2025-08-12 DIAGNOSIS — N18.31 STAGE 3A CHRONIC KIDNEY DISEASE (H): Primary | ICD-10-CM

## 2025-08-12 DIAGNOSIS — E66.9 TYPE 2 DIABETES MELLITUS WITH OBESITY (H): ICD-10-CM

## 2025-08-12 DIAGNOSIS — I25.10 CORONARY ARTERY DISEASE INVOLVING NATIVE CORONARY ARTERY OF NATIVE HEART WITHOUT ANGINA PECTORIS: ICD-10-CM

## 2025-08-12 DIAGNOSIS — Z00.00 HEALTHCARE MAINTENANCE: ICD-10-CM

## 2025-08-12 DIAGNOSIS — Z00.00 HEALTH CARE MAINTENANCE: ICD-10-CM

## 2025-08-12 LAB
EST. AVERAGE GLUCOSE BLD GHB EST-MCNC: 140 MG/DL
HBA1C MFR BLD: 6.5 % (ref 0–5.6)
HGB BLD-MCNC: 13 G/DL (ref 13.3–17.7)
MCV RBC AUTO: 88.6 FL (ref 78–100)

## 2025-08-12 RX ORDER — METFORMIN HYDROCHLORIDE 500 MG/1
1000 TABLET, EXTENDED RELEASE ORAL 2 TIMES DAILY
Qty: 360 TABLET | Refills: 3 | Status: SHIPPED | OUTPATIENT
Start: 2025-08-12

## 2025-08-12 RX ORDER — PIOGLITAZONE 15 MG/1
15 TABLET ORAL DAILY
Qty: 90 TABLET | Refills: 3 | Status: SHIPPED | OUTPATIENT
Start: 2025-08-12

## 2025-08-12 RX ORDER — AMLODIPINE BESYLATE 5 MG/1
5 TABLET ORAL DAILY
Qty: 90 TABLET | Refills: 3 | Status: SHIPPED | OUTPATIENT
Start: 2025-08-12

## 2025-08-12 RX ORDER — ATORVASTATIN CALCIUM 40 MG/1
40 TABLET, FILM COATED ORAL DAILY
Qty: 90 TABLET | Refills: 3 | Status: SHIPPED | OUTPATIENT
Start: 2025-08-12

## 2025-08-12 SDOH — HEALTH STABILITY: PHYSICAL HEALTH: ON AVERAGE, HOW MANY MINUTES DO YOU ENGAGE IN EXERCISE AT THIS LEVEL?: 20 MIN

## 2025-08-12 SDOH — HEALTH STABILITY: PHYSICAL HEALTH: ON AVERAGE, HOW MANY DAYS PER WEEK DO YOU ENGAGE IN MODERATE TO STRENUOUS EXERCISE (LIKE A BRISK WALK)?: 5 DAYS

## 2025-08-12 ASSESSMENT — SOCIAL DETERMINANTS OF HEALTH (SDOH): HOW OFTEN DO YOU GET TOGETHER WITH FRIENDS OR RELATIVES?: MORE THAN THREE TIMES A WEEK
